# Patient Record
Sex: FEMALE | Race: WHITE | NOT HISPANIC OR LATINO | Employment: FULL TIME | URBAN - METROPOLITAN AREA
[De-identification: names, ages, dates, MRNs, and addresses within clinical notes are randomized per-mention and may not be internally consistent; named-entity substitution may affect disease eponyms.]

---

## 2018-11-21 ENCOUNTER — MEDICAL CORRESPONDENCE (OUTPATIENT)
Dept: HEALTH INFORMATION MANAGEMENT | Facility: CLINIC | Age: 43
End: 2018-11-21

## 2018-11-21 ENCOUNTER — TRANSFERRED RECORDS (OUTPATIENT)
Dept: HEALTH INFORMATION MANAGEMENT | Facility: CLINIC | Age: 43
End: 2018-11-21

## 2018-11-29 ASSESSMENT — ENCOUNTER SYMPTOMS
SPEECH CHANGE: 0
LOSS OF CONSCIOUSNESS: 0
DIZZINESS: 1
TINGLING: 1
NUMBNESS: 1
SEIZURES: 0
HEADACHES: 1
MEMORY LOSS: 0
TREMORS: 0
DISTURBANCES IN COORDINATION: 0
PARALYSIS: 0
WEAKNESS: 1

## 2018-12-02 ENCOUNTER — HEALTH MAINTENANCE LETTER (OUTPATIENT)
Age: 43
End: 2018-12-02

## 2018-12-07 ENCOUNTER — OFFICE VISIT (OUTPATIENT)
Dept: NEUROLOGY | Facility: CLINIC | Age: 43
End: 2018-12-07
Payer: COMMERCIAL

## 2018-12-07 VITALS
HEART RATE: 81 BPM | OXYGEN SATURATION: 100 % | SYSTOLIC BLOOD PRESSURE: 117 MMHG | DIASTOLIC BLOOD PRESSURE: 75 MMHG | WEIGHT: 131 LBS | BODY MASS INDEX: 21.83 KG/M2 | HEIGHT: 65 IN | TEMPERATURE: 97.8 F

## 2018-12-07 DIAGNOSIS — H90.5 SENSORY HEARING LOSS, UNILATERAL: ICD-10-CM

## 2018-12-07 DIAGNOSIS — M54.12 CERVICAL RADICULOPATHY: Primary | ICD-10-CM

## 2018-12-07 DIAGNOSIS — R42 VERTIGO: ICD-10-CM

## 2018-12-07 ASSESSMENT — PAIN SCALES - GENERAL: PAINLEVEL: MILD PAIN (3)

## 2018-12-07 NOTE — LETTER
"2018       RE: Amy Desir  [address] [place]  [facility] [place] [address]     Dear Colleague,    Thank you for referring your patient, Amy Desir, to the [facility] NEUROLOGY at [facility]. Please see a copy of my visit note below.    Service Date: 2018      Kirsten Rivera PA-C   [address] [address]      RE: Amy Desir   MRN: [id number]   : 1975      Dear Dr. Rivera:        Thank you for referring Amy Desir for neurologic consultation on 2018.  The patient comes with chief complaints of neck and head pain, vertigo, and left arm pain.      The patient has a somewhat complicated history.  She said that she has suffered from headache and neck pain most of her life.  She said these headaches are bifrontal temporal.  She basically wakes up and goes to bed with them.  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  She has been using a mouthguard since .  She is aware that she does grind and clench her teeth.  The patient has just lived with these headaches and has at times used ibuprofen.  She related this discomfort to braces being placed when she was in puberty.  The patient did have a new event in 2017.  She had been involved in what she described as strenuous workouts.  She said this would last up to an hour.  She would do these approximately once per week.  She related that she was doing more strenuous overhead lifting.  She was using dumbbells that weighed about 8 pounds each.  She noted that the entire left arm became numb.  She was nauseated.  Two days later she recollected that she was dizzy.  She was evaluated at [facility].  She has had an EKG was done to \"rule out a heart attack.\"  She was told that she had a \"pinched nerve\".  She went a few times to physical therapy.  The patient did note back then that those symptoms involving the left arm " "were worse with sitting and better with walking.  She started to do meditation and stretching exercises.  She avoided sitting too long.  She reduced the amount of weight she was using to 5-pound dumbbells.  The patient said that she still has some residual left arm numbness.  Then she went with her  to a  in [place], [place].  She said that this involved a long car ride.  She said in the middle of the night she felt as if she was \"tilting\".  She was quite nauseated.  She did note that her heart rate increased to \"140.\"  The patient said that she developed true vertigo.  She had nausea but did not vomit.  That continued.  She still has some sensation of vertigo at times, but it is more of an off kilter feeling.  She did describe having chronic neck pain even as a teenager.  She does not recall any injury.  She was a gymnast.  She has never had Lhermitte sign, difficulty passing her stool, but she does have some urinary leakage that she blames on childbirth.  She wears a tampon when she works out and she does not have leakage otherwise.  She has never had any decreased perirectal or genital sensory loss.  She has had no trouble with her right arm or both legs.  The patient says her balance is good.  She did though recall that she has had some tingling then later involving the left upper thigh.  She could not really give me that many details about that.  The patient did note that ever since high school she has treated with a chiropractor.  She estimates she has had at least 50 treatments.  The patient has only had an activator used as her  did suffer a carotid dissection.  Fortunately, he suffered no stroke symptoms with that.  She denied any visual loss.  She did note that at age 12 she developed at times what she described as a \"pixilated\" visual symptom.  This would last possibly up to 20 minutes.  The patient said that she has never had other issues with her vision and denied any actual " focal weakness.  She earlier told me her balance is good.  The patient said that yesterday she vomited.  She did have a few days of vertigo in November but when it was ongoing it only lasted until 11/05 and then she had an off kilter sensation.  She does not recall having any upper respiratory tract infections or head trauma.  The patient did note that she has had off and on since she was a teenager what she described as tinnitus that has not been pulsatile.  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  She noted that her grandmother on her mother's side had the same type of finding.      The patient did have a number of tests done that I reviewed with her.  She had a CT angiogram of the head and neck on 11/28/2017.  This was for a history of dizziness and giddiness.  This was unremarkable.  I could not get the actual vertebral studies and did review these then with Dr. Flores.  The initial study was done by Dr. Rogers.  The patient did have also other studies done.  She had on 11/22/2017 a carotid ultrasound.  Vertebral waveforms on the left demonstrated manifestations that could be associated with early steal phenomena from proximal subclavian artery stenosis.  This in part led to the CT angiogram.  In the patient's duplex ultrasound they also checked radial and ulnar arteries and these appeared to be basically unremarkable.      The patient did have other records I reviewed with her.  These included those from [place].  She never did have an MRI scan.  The patient did quit smoking at age 23.  She quit after about 6 years.  She probably does drink alcohol, either wine or beer, 5 days out of 7.  She does not drink beyond that.  She did review with me her vocation.  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  She has no listed drug allergies.  The patient has had a LEEP procedure and a D&C.  She reviewed her family  history and her father is doing well.  He is a recovering alcoholic and he had bypass surgery and is now 70.  Her mother has had a history of thyroid disease and is otherwise doing well.  She has 1 full sister and a half-brother.  He evidently has had some issues with alcohol, but overall he is doing well and her sister is too.  The patient did get a prescription for minocycline.  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      The patient did describe a family history of hearing loss and she has had some hearing loss now that has been gradual over the last 4-5 years and may be in 1 ear but she cannot really tell.      Neurologic examination revealed a pleasant woman.  She does have a port wine stain involving her forehead.  Her blood pressure was 117/75 with a pulse of 81.  Gait, station, cerebellar testing, muscle stretch reflexes that were brisk and symmetric in the arms and knees with ankle jerks being normal with positive Brown reflexes with no clonus in the legs and negative plantar stimulation, normal strength, cranial nerve examination except for a mild miosis of the right pupil compared to the left, but they are both reactive with full visual fields and full extraocular movements with symmetric face, tongue and uvula with fluent speech, superficial and cortical sensory testing are unremarkable.  The patient had normal range of motion of her neck, although it hurt for her to extend her head to the left.  She had a negative Barany test or spinning her and she had a negative Nylen-Barany test and it did not produce any of her symptoms.  I could not auscultate cervical bruits.  She had a regular cardiac rhythm without gallops or murmurs.      IMPRESSION:   1.  Left arm symptoms, but probably represent a cervical radicular syndrome.   2.  Vertigo, which could relate to either vestibular neuronitis or less likely to benign positional vertigo.      I went over the  patient her issues.  She also has had tinnitus.  She is going to go ahead now and have further studies including MRI scan of the brain and also her cervical spine.  The patient does need this MRI scan study because of her gradual hearing loss.  Depending on the test results, I will make other recommendations.        Sincerely yours,       Leandro Garcia MD      I spent 1 hour with the patient.  Over 50% of the time this involved counseling and coordination of care.  A complete review of medical systems was done and the positive review is listed in the report above.

## 2018-12-07 NOTE — MR AVS SNAPSHOT
After Visit Summary   12/7/2018    Amy Desir    MRN: [id number]           Patient Information     Date Of Birth          1975        Visit Information        Provider Department      12/7/2018 2:30 PM Leandro Garcia MD [facility] Neurology        Today's Diagnoses     Cervical radiculopathy    -  1    Vertigo        Sensory hearing loss, unilateral           Follow-ups after your visit        Follow-up notes from your care team     Return in about 10 days (around 12/17/2018).      Your next 10 appointments already scheduled     Dec 17, 2018  2:30 PM CST   MR CERVICAL SPINE W/O CONTRAST with URMR2   [facility], [place], MRI ([facility])    [facility] [address] [phone]           How do I prepare for my exam? (Food and drink instructions) **If you will be receiving sedation or general anesthesia, please see special notes below.**  How do I prepare for my exam? (Other instructions) Take your medicines as usual, unless your doctor tells you not to. Please remove any body piercings and hair extensions before you arrive. Follow your doctor s orders. If you do not, we may have to postpone your exam. You may or may not receive IV contrast for this exam pending the discretion of the Radiologist.  You do not need to do anything special to prepare. **If you will be receiving sedation or general anesthesia, please see special notes below.**  What should I wear:  The MRI machine uses a strong magnet. Please wear clothes without metal (snaps, zippers). A sweatsuit works well, or we may give you a hospital gown.  How long does the exam take: Most tests take 30 to 60 minutes.  HOWEVER, IF YOUR DOCTOR PRESCRIBES ANESTHESIA please plan on spending four to five hours in the recovery room.  What should I bring: Bring a list of your current medicines to your exam (including vitamins, minerals and over-the-counter drugs). Also  bring the results of similar scans you may have had.  Do I need a : **If you will be receiving sedation or general anesthesia, please see special notes below.**  What should I do after the exam? No Restrictions, You may resume normal activities.  What is this test: MRI (magnetic resonance imaging) uses a strong magnet and radio waves to look inside the body. An MRA (magnetic resonance angiogram) does the same thing, but it lets us look at your blood vessels. A computer turns the radio waves into pictures showing cross sections of the body, much like slices of bread. This helps us see any problems more clearly.  Who should I call with questions: Please call the Imaging Department at your exam site with any questions. Directions, parking instructions, and other information is available on our website, [email or web address].  How do I prepare if I m having sedation or anesthesia? **IMPORTANT** THE INSTRUCTIONS BELOW ARE ONLY FOR THOSE PATIENTS WHO HAVE BEEN TOLD THEY WILL RECEIVE SEDATION OR GENERAL ANESTHESIA DURING THEIR MRI PROCEDURE:  IF YOU WILL RECEIVE SEDATION (take medicine to help you relax during your exam): You must get the medicine from your doctor before you arrive. Bring the medicine to the exam. Do not take it at home. Arrive one hour early. Bring someone who can take you home after the test. Your medicine will make you sleepy. After the exam, you may not drive, take a bus or take a taxi by yourself. No eating 8 hours before your exam. You may have clear liquids up until 4 hours before your exam. (Clear liquids include water, clear tea, black coffee and fruit juice without pulp.)  IF YOU WILL RECEIVE ANESTHESIA (be asleep for your exam): Arrive 1 1/2 hours early. Bring someone who can take you home after the test. You may not drive, take a bus or take a taxi by yourself. No eating 8 hours before your exam. You may have clear liquids up until 4 hours before your exam. (Clear liquids include water,  clear tea, black coffee and fruit juice without pulp.) You will spend four to five hours in the recovery room.            Dec 17, 2018  3:15 PM CST   MR BRAIN W/O & W CONTRAST with URMR2   [facility], [place], MRI ([facility])    [facility] [address] [phone]           How do I prepare for my exam? (Food and drink instructions) **If you will be receiving sedation or general anesthesia, please see special notes below.**  How do I prepare for my exam? (Other instructions) Take your medicines as usual, unless your doctor tells you not to. You may or may not receive intravenous (IV) contrast for this exam pending the discretion of the Radiologist.  You do not need to do anything special to prepare.  **If you will be receiving sedation or general anesthesia, please see special notes below.**  What should I wear: The MRI machine uses a strong magnet. Please wear clothes without metal (snaps, zippers). A sweatsuit works well, or we may give you a hospital gown. Please remove any body piercings and hair extensions before you arrive. You will also remove watches, jewelry, hairpins, wallets, dentures, partial dental plates and hearing aids. You may wear contact lenses, and you may be able to wear your rings. We have a safe place to keep your personal items, but it is safer to leave them at home.  How long does the exam take: Most tests take 30 to 60 minutes.  HOWEVER, IF YOUR DOCTOR PRESCRIBES ANESTHESIA please plan on spending four to five hours in the recovery room.  What should I bring:  Bring a list of your current medicines to your exam (including vitamins, minerals and over-the-counter drugs).  Do I need a :  **If you will be receiving sedation or general anesthesia, please see special notes below.**  What should I do after the exam: No Restrictions, You may resume normal activities.  What is this test: MRI (magnetic resonance imaging) uses  a strong magnet and radio waves to look inside the body. An MRA (magnetic resonance angiogram) does the same thing, but it lets us look at your blood vessels. A computer turns the radio waves into pictures showing cross sections of the body, much like slices of bread. This helps us see any problems more clearly. You may receive fluid (called  contrast ) before or during your scan. The fluid helps us see the pictures better. We give the fluid through an IV (small needle in your arm).  Who should I call with questions:  Please call the Imaging Department at your exam site with any questions. Directions, parking instructions, and other information is available on our website, [email or web address].  How do I prepare if I m having sedation or anesthesia? **IMPORTANT** THE INSTRUCTIONS BELOW ARE ONLY FOR THOSE PATIENTS WHO HAVE BEEN TOLD THEY WILL RECEIVE SEDATION OR GENERAL ANESTHESIA DURING THEIR MRI PROCEDURE:  IF YOU WILL RECEIVE SEDATION (take medicine to help you relax during your exam): You must get the medicine from your doctor before you arrive. Bring the medicine to the exam. Do not take it at home. Arrive one hour early. Bring someone who can take you home after the test. Your medicine will make you sleepy. After the exam, you may not drive, take a bus or take a taxi by yourself. No eating 8 hours before your exam. You may have clear liquids up until 4 hours before your exam. (Clear liquids include water, clear tea, black coffee and fruit juice without pulp.)  IF YOU WILL RECEIVE ANESTHESIA (be asleep for your exam): Arrive 1 1/2 hours early. Bring someone who can take you home after the test. You may not drive, take a bus or take a taxi by yourself. No eating 8 hours before your exam. You may have clear liquids up until 4 hours before your exam. (Clear liquids include water, clear tea, black coffee and fruit juice without pulp.)            Dec 18, 2018  9:30 AM CST   (Arrive by 9:15 AM)   Return Visit with  "Leandro Garcia MD   [facility] Neurology ([facility])    [address] [address] [phone]              Future tests that were ordered for you today     Open Future Orders        Priority Expected Expires Ordered    MRI Brain w & w/o contrast Routine 12/7/2018 12/7/2019 12/7/2018    MRI Cervical spine w/o contrast Routine  12/7/2019 12/7/2018            Who to contact     Please call your clinic at [phone] to:    Ask questions about your health    Make or cancel appointments    Discuss your medicines    Learn about your test results    Speak to your doctor            Additional Information About Your Visit        [email or web address] Information     [employer] gives you secure access to your electronic health record. If you see a primary care provider, you can also send messages to your care team and make appointments. If you have questions, please call your primary care clinic.  If you do not have a primary care provider, please call [phone] and they will assist you.      [employer] is an electronic gateway that provides easy, online access to your medical records. With [employer], you can request a clinic appointment, read your test results, renew a prescription or communicate with your care team.     To access your existing account, please contact your [facility] or call [phone] for assistance.        Care EveryWhere ID     This is your Care EveryWhere ID. This could be used by other organizations to access your [place] medical records  [id number]        Your Vitals Were     Pulse Temperature Height Last Period Pulse Oximetry BMI (Body Mass Index)    81 97.8  F (36.6  C) (Oral) 1.638 m (5' 4.5\") 11/23/2018 100% 22.14 kg/m2       Blood Pressure from Last 3 Encounters:   12/07/18 117/75    Weight from Last 3 Encounters:   12/07/18 59.4 kg (131 lb)               Primary Care Provider    None Specified       No primary " provider on file.        Equal Access to Services     JOHANN DOMINGUEZ : Hadii betty Deutsch, naty pereira, reji rothman. So [facility] [phone].    ATENCIÓN: Si habla español, tiene a hinds disposición servicios gratuitos de asistencia lingüística. Llame al [phone].    We comply with applicable federal civil rights laws and [place] laws. We do not discriminate on the basis of race, color, national origin, age, disability, sex, sexual orientation, or gender identity.            Thank you!     Thank you for choosing [facility] NEUROLOGY  for your care. Our goal is always to provide you with excellent care. Hearing back from our patients is one way we can continue to improve our services. Please take a few minutes to complete the written survey that you may receive in the mail after your visit with us. Thank you!             Your Updated Medication List - Protect others around you: Learn how to safely use, store and throw away your medicines at www.disposemymeds.org.      Notice  As of 12/7/2018  3:53 PM    You have not been prescribed any medications.

## 2018-12-07 NOTE — NURSING NOTE
Chief Complaint   Patient presents with     New Patient     CERVICAL RADICUOPATHY WITH VERTIGO       Sol Kingsley MA

## 2018-12-10 NOTE — PROGRESS NOTES
"Service Date: 2018      Kirsten Rivera PA-C   [address] [address]      RE: Amy Desir   MRN: [id number]   : 1975      Dear Dr. Rivera:        Thank you for referring Amy Desir for neurologic consultation on 2018.  The patient comes with chief complaints of neck and head pain, vertigo, and left arm pain.      The patient has a somewhat complicated history.  She said that she has suffered from headache and neck pain most of her life.  She said these headaches are bifrontal temporal.  She basically wakes up and goes to bed with them.  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  She has been using a mouthguard since .  She is aware that she does grind and clench her teeth.  The patient has just lived with these headaches and has at times used ibuprofen.  She related this discomfort to braces being placed when she was in puberty.  The patient did have a new event in 2017.  She had been involved in what she described as strenuous workouts.  She said this would last up to an hour.  She would do these approximately once per week.  She related that she was doing more strenuous overhead lifting.  She was using dumbbells that weighed about 8 pounds each.  She noted that the entire left arm became numb.  She was nauseated.  Two days later she recollected that she was dizzy.  She was evaluated at [facility].  She has had an EKG was done to \"rule out a heart attack.\"  She was told that she had a \"pinched nerve\".  She went a few times to physical therapy.  The patient did note back then that those symptoms involving the left arm were worse with sitting and better with walking.  She started to do meditation and stretching exercises.  She avoided sitting too long.  She reduced the amount of weight she was using to 5-pound dumbbells.  The patient said that she still has some residual left arm numbness.  Then she went with " "her  to a  in [place], [place].  She said that this involved a long car ride.  She said in the middle of the night she felt as if she was \"tilting\".  She was quite nauseated.  She did note that her heart rate increased to \"140.\"  The patient said that she developed true vertigo.  She had nausea but did not vomit.  That continued.  She still has some sensation of vertigo at times, but it is more of an off kilter feeling.  She did describe having chronic neck pain even as a teenager.  She does not recall any injury.  She was a gymnast.  She has never had Lhermitte sign, difficulty passing her stool, but she does have some urinary leakage that she blames on childbirth.  She wears a tampon when she works out and she does not have leakage otherwise.  She has never had any decreased perirectal or genital sensory loss.  She has had no trouble with her right arm or both legs.  The patient says her balance is good.  She did though recall that she has had some tingling then later involving the left upper thigh.  She could not really give me that many details about that.  The patient did note that ever since high school she has treated with a chiropractor.  She estimates she has had at least 50 treatments.  The patient has only had an activator used as her  did suffer a carotid dissection.  Fortunately, he suffered no stroke symptoms with that.  She denied any visual loss.  She did note that at age 12 she developed at times what she described as a \"pixilated\" visual symptom.  This would last possibly up to 20 minutes.  The patient said that she has never had other issues with her vision and denied any actual focal weakness.  She earlier told me her balance is good.  The patient said that yesterday she vomited.  She did have a few days of vertigo in November but when it was ongoing it only lasted until  and then she had an off kilter sensation.  She does not recall having any upper respiratory " tract infections or head trauma.  The patient did note that she has had off and on since she was a teenager what she described as tinnitus that has not been pulsatile.  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  She noted that her grandmother on her mother's side had the same type of finding.      The patient did have a number of tests done that I reviewed with her.  She had a CT angiogram of the head and neck on 11/28/2017.  This was for a history of dizziness and giddiness.  This was unremarkable.  I could not get the actual vertebral studies and did review these then with Dr. Flores.  The initial study was done by Dr. Rogers.  The patient did have also other studies done.  She had on 11/22/2017 a carotid ultrasound.  Vertebral waveforms on the left demonstrated manifestations that could be associated with early steal phenomena from proximal subclavian artery stenosis.  This in part led to the CT angiogram.  In the patient's duplex ultrasound they also checked radial and ulnar arteries and these appeared to be basically unremarkable.      The patient did have other records I reviewed with her.  These included those from [facility].  She never did have an MRI scan.  The patient did quit smoking at age 23.  She quit after about 6 years.  She probably does drink alcohol, either wine or beer, 5 days out of 7.  She does not drink beyond that.  She did review with me her vocation.  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  She has no listed drug allergies.  The patient has had a LEEP procedure and a D&C.  She reviewed her family history and her father is doing well.  He is a recovering alcoholic and he had bypass surgery and is now 70.  Her mother has had a history of thyroid disease and is otherwise doing well.  She has 1 full sister and a half-brother.  He evidently has had some issues with alcohol, but overall he is doing  well and her sister is too.  The patient did get a prescription for minocycline.  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      The patient did describe a family history of hearing loss and she has had some hearing loss now that has been gradual over the last 4-5 years and may be in 1 ear but she cannot really tell.      Neurologic examination revealed a pleasant woman.  She does have a port wine stain involving her forehead.  Her blood pressure was 117/75 with a pulse of 81.  Gait, station, cerebellar testing, muscle stretch reflexes that were brisk and symmetric in the arms and knees with ankle jerks being normal with positive Brown reflexes with no clonus in the legs and negative plantar stimulation, normal strength, cranial nerve examination except for a mild miosis of the right pupil compared to the left, but they are both reactive with full visual fields and full extraocular movements with symmetric face, tongue and uvula with fluent speech, superficial and cortical sensory testing are unremarkable.  The patient had normal range of motion of her neck, although it hurt for her to extend her head to the left.  She had a negative Barany test or spinning her and she had a negative Nylen-Barany test and it did not produce any of her symptoms.  I could not auscultate cervical bruits.  She had a regular cardiac rhythm without gallops or murmurs.      IMPRESSION:   1.  Left arm symptoms, but probably represent a cervical radicular syndrome.   2.  Vertigo, which could relate to either vestibular neuronitis or less likely to benign positional vertigo.      I went over the patient her issues.  She also has had tinnitus.  She is going to go ahead now and have further studies including MRI scan of the brain and also her cervical spine.  The patient does need this MRI scan study because of her gradual hearing loss.  Depending on the test results, I will make other  recommendations.        Sincerely yours,       José Garcia MD      I spent 1 hour with the patient.  Over 50% of the time this involved counseling and coordination of care.  A complete review of medical systems was done and the positive review is listed in the report above.         JOSÉ GARCIA MD             D: 12/10/2018   T: 12/10/2018   MT: AKA      Name:     RUDDY HAMMOND   MRN:      [id number]        Account:      [id number]   :      1975           Service Date: 2018      Document: [id number]

## 2018-12-17 ENCOUNTER — HOSPITAL ENCOUNTER (OUTPATIENT)
Dept: MRI IMAGING | Facility: CLINIC | Age: 43
End: 2018-12-17
Attending: PSYCHIATRY & NEUROLOGY
Payer: COMMERCIAL

## 2018-12-17 ENCOUNTER — HOSPITAL ENCOUNTER (OUTPATIENT)
Dept: MRI IMAGING | Facility: CLINIC | Age: 43
Discharge: HOME | End: 2018-12-17
Attending: PSYCHIATRY & NEUROLOGY | Admitting: PSYCHIATRY & NEUROLOGY
Payer: COMMERCIAL

## 2018-12-17 DIAGNOSIS — H90.5 SENSORY HEARING LOSS, UNILATERAL: ICD-10-CM

## 2018-12-17 DIAGNOSIS — R42 VERTIGO: ICD-10-CM

## 2018-12-17 DIAGNOSIS — M54.12 CERVICAL RADICULOPATHY: ICD-10-CM

## 2018-12-17 PROCEDURE — A9585 GADOBUTROL INJECTION: HCPCS | Performed by: PSYCHIATRY & NEUROLOGY

## 2018-12-17 PROCEDURE — 70553 MRI BRAIN STEM W/O & W/DYE: CPT

## 2018-12-17 PROCEDURE — 25500064 ZZH RX 255 OP 636: Performed by: PSYCHIATRY & NEUROLOGY

## 2018-12-17 PROCEDURE — 72141 MRI NECK SPINE W/O DYE: CPT

## 2018-12-17 RX ORDER — GADOBUTROL 604.72 MG/ML
7.5 INJECTION INTRAVENOUS ONCE
Status: COMPLETED | OUTPATIENT
Start: 2018-12-17 | End: 2018-12-17

## 2018-12-17 RX ADMIN — GADOBUTROL 6 ML: 604.72 INJECTION INTRAVENOUS at 14:35

## 2018-12-18 ENCOUNTER — OFFICE VISIT (OUTPATIENT)
Dept: NEUROLOGY | Facility: CLINIC | Age: 43
End: 2018-12-18

## 2018-12-18 VITALS — DIASTOLIC BLOOD PRESSURE: 64 MMHG | OXYGEN SATURATION: 100 % | HEART RATE: 75 BPM | SYSTOLIC BLOOD PRESSURE: 115 MMHG

## 2018-12-18 DIAGNOSIS — G54.0 THORACIC OUTLET SYNDROME: Primary | ICD-10-CM

## 2018-12-18 ASSESSMENT — PAIN SCALES - GENERAL: PAINLEVEL: NO PAIN (0)

## 2018-12-18 NOTE — NURSING NOTE
Chief Complaint   Patient presents with     RECHECK     [facility] RETURN - CERVICAL RADICULOPATHY WITH VERTIGO       Damien Dee, EMT

## 2018-12-18 NOTE — LETTER
2018       RE: Amy Desir  [address]  [facility] [address]     Dear Colleague,    Thank you for referring your patient, Amy Desir, to the [facility] NEUROLOGY at [facility]. Please see a copy of my visit note below.    Service Date: 2018      Kirsten Rivera PA-C   [address] [address]      RE: Amy Desir   MRN: [id number]   : 1975      Dear Dr. Rivera:      This is in regard to followup on Amy Desir.  The patient returned today with chief complaints of head and neck pain, vertigo, left arm pain and paresthesias.      The patient reviewed my consultation on 2018 and said it was correct.  I did go over with her studies that I have ordered and went over the actual films.  Her MRI scan from 2018 did show that she had multilevel cervical spondylosis and did have listhesis at C3-C4 and C6-C7.  It was most pronounced at C6-C7 and she had mild neural foraminal stenosis and the radiologist also felt she had mild spinal canal stenosis.  I reassured her that I did not think that she had pathologically significant foraminal stenosis nor canal stenosis.  I did point out the abnormalities to her.  The patient also did have an MRI scan of the brain with and without contrast.  In particular, she did have special images involving the seventh and eighth cranial nerves.  All of these studies were totally unremarkable.  I went over again the actual films with her.  I reassured her that this study was normal.      The patient did go over with me again her use of physical therapy.  She is pleased with a therapist you referred her to add [facility].      PHYSICAL EXAMINATION:  I did examine the patient again today.     VITAL SIGNS:  Her blood pressure is 115/64 with a pulse of 75.     She did have good radial pulses.  She had a positive Adson sign on the left  with her left arm abducted.  She had marked decrease in the left radial pulse.  She noted with holding her breath and turning her head to the right that her left hand symptoms started.  She also had a positive Tinel sign over the left supraclavicular fossa.      I have talked with the patient about the possibility that some of her symptoms in her left arm could relate to thoracic outlet syndrome.  I went over this condition with her.  I did describe exercises that are basically postural exercises to try.  I am going to refer her back to her [facility] physical therapist for this.  I went over other studies that could be done including EMG testing of the left arm as well as ultrasound of her arteries in her arms in different positions to be checked for significant thoracic outlet syndrome.  I went over from [facility] with her the diagnosis of thoracic outlet syndrome and studies that could be done including the MRI scan that has already been performed.  The patient did promise to have neurologic followup with me if she does not improve with my suggestions.  She understands if she has recurrent vertigo she is to come back here also.  I explained to her that it was possible she had vestibular neuronitis and has some mild lingering symptoms.  Again, I told her that I could not rule out that she did not have benign positional vertigo, although I doubted it with her history of sudden severe vertigo with improvement over time.      I spent 25 minutes with the patient today.  Over 50% of the time this involved counseling and coordination of care.      JOSÉ GAVIRIA MD             D: 2018   T: 2018   MT: AKA      Name:     RUDDY HAMMOND   MRN:      [id number]        Account:      [id number]   :      1975           Service Date: 2018      Document: [id number]

## 2018-12-18 NOTE — PROGRESS NOTES
Service Date: 2018      Kirsten Rivera PA-C   [address] [address]      RE: Amy Desir   MRN: [id number]   : 1975      Dear Dr. Rivera:      This is in regard to followup on Amy Desir.  The patient returned today with chief complaints of head and neck pain, vertigo, left arm pain and paresthesias.      The patient reviewed my consultation on 2018 and said it was correct.  I did go over with her studies that I have ordered and went over the actual films.  Her MRI scan from 2018 did show that she had multilevel cervical spondylosis and did have listhesis at C3-C4 and C6-C7.  It was most pronounced at C6-C7 and she had mild neural foraminal stenosis and the radiologist also felt she had mild spinal canal stenosis.  I reassured her that I did not think that she had pathologically significant foraminal stenosis nor canal stenosis.  I did point out the abnormalities to her.  The patient also did have an MRI scan of the brain with and without contrast.  In particular, she did have special images involving the seventh and eighth cranial nerves.  All of these studies were totally unremarkable.  I went over again the actual films with her.  I reassured her that this study was normal.      The patient did go over with me again her use of physical therapy.  She is pleased with a therapist you referred her to add [facility].      PHYSICAL EXAMINATION:  I did examine the patient again today.     VITAL SIGNS:  Her blood pressure is 115/64 with a pulse of 75.     She did have good radial pulses.  She had a positive Adson sign on the left with her left arm abducted.  She had marked decrease in the left radial pulse.  She noted with holding her breath and turning her head to the right that her left hand symptoms started.  She also had a positive Tinel sign over the left supraclavicular fossa.      I have talked with the patient about  the possibility that some of her symptoms in her left arm could relate to thoracic outlet syndrome.  I went over this condition with her.  I did describe exercises that are basically postural exercises to try.  I am going to refer her back to her [facility] physical therapist for this.  I went over other studies that could be done including EMG testing of the left arm as well as ultrasound of her arteries in her arms in different positions to be checked for significant thoracic outlet syndrome.  I went over from [facility] with her the diagnosis of thoracic outlet syndrome and studies that could be done including the MRI scan that has already been performed.  The patient did promise to have neurologic followup with me if she does not improve with my suggestions.  She understands if she has recurrent vertigo she is to come back here also.  I explained to her that it was possible she had vestibular neuronitis and has some mild lingering symptoms.  Again, I told her that I could not rule out that she did not have benign positional vertigo, although I doubted it with her history of sudden severe vertigo with improvement over time.      Thank you for allowing me to see this patient.       Sincerely yours,       José Garcia MD      I spent 25 minutes with the patient today.  Over 50% of the time this involved counseling and coordination of care.         JOSÉ GARCIA MD             D: 2018   T: 2018   MT: AKA      Name:     RUDDY HAMMOND   MRN:      [id number]        Account:      [id number]   :      1975           Service Date: 2018      Document: [id number]

## 2019-08-14 ENCOUNTER — TELEPHONE (OUTPATIENT)
Dept: NEUROLOGY | Facility: CLINIC | Age: 44
End: 2019-08-14

## 2019-08-14 NOTE — TELEPHONE ENCOUNTER
Health Call Center    Phone Message    May a detailed message be left on voicemail: yes    Reason for Call: Order(s):Update physical therapy order  Reason for requested: Fax sent with Request on 7.30.19  Date needed: ASAP  Provider name: Dr. Garcia Fax [phone]      Action Taken: Message routed to:  [facility] ([facility] neurology

## 2019-08-15 DIAGNOSIS — G54.0 THORACIC OUTLET SYNDROME: Primary | ICD-10-CM

## 2019-10-04 ENCOUNTER — HEALTH MAINTENANCE LETTER (OUTPATIENT)
Age: 44
End: 2019-10-04

## 2020-10-12 LAB — PAP SMEAR - HIM PATIENT REPORTED: NORMAL

## 2020-11-08 ENCOUNTER — HEALTH MAINTENANCE LETTER (OUTPATIENT)
Age: 45
End: 2020-11-08

## 2021-01-31 ENCOUNTER — HEALTH MAINTENANCE LETTER (OUTPATIENT)
Age: 46
End: 2021-01-31

## 2021-09-11 ENCOUNTER — HEALTH MAINTENANCE LETTER (OUTPATIENT)
Age: 46
End: 2021-09-11

## 2021-11-12 ENCOUNTER — ANCILLARY PROCEDURE (OUTPATIENT)
Dept: MAMMOGRAPHY | Facility: CLINIC | Age: 46
End: 2021-11-12
Attending: OBSTETRICS & GYNECOLOGY
Payer: COMMERCIAL

## 2021-11-12 DIAGNOSIS — Z12.31 VISIT FOR SCREENING MAMMOGRAM: ICD-10-CM

## 2021-11-12 PROCEDURE — 77067 SCR MAMMO BI INCL CAD: CPT | Mod: GC | Performed by: RADIOLOGY

## 2021-11-12 PROCEDURE — 77063 BREAST TOMOSYNTHESIS BI: CPT | Mod: GC | Performed by: RADIOLOGY

## 2021-11-22 ENCOUNTER — ANCILLARY PROCEDURE (OUTPATIENT)
Dept: MAMMOGRAPHY | Facility: CLINIC | Age: 46
End: 2021-11-22
Attending: OBSTETRICS & GYNECOLOGY
Payer: COMMERCIAL

## 2021-11-22 DIAGNOSIS — R92.8 ABNORMAL MAMMOGRAM OF BOTH BREASTS: ICD-10-CM

## 2021-11-22 PROCEDURE — G0279 TOMOSYNTHESIS, MAMMO: HCPCS | Performed by: RADIOLOGY

## 2021-11-22 PROCEDURE — 77066 DX MAMMO INCL CAD BI: CPT | Performed by: RADIOLOGY

## 2021-11-22 PROCEDURE — 76642 ULTRASOUND BREAST LIMITED: CPT | Mod: RT | Performed by: RADIOLOGY

## 2021-11-30 ENCOUNTER — ANCILLARY PROCEDURE (OUTPATIENT)
Dept: MAMMOGRAPHY | Facility: CLINIC | Age: 46
End: 2021-11-30
Attending: OBSTETRICS & GYNECOLOGY
Payer: COMMERCIAL

## 2021-11-30 DIAGNOSIS — R92.8 ABNORMAL FINDING ON BREAST IMAGING: ICD-10-CM

## 2021-11-30 DIAGNOSIS — R92.1 BREAST CALCIFICATIONS: ICD-10-CM

## 2021-11-30 PROCEDURE — 88305 TISSUE EXAM BY PATHOLOGIST: CPT | Mod: 26 | Performed by: PATHOLOGY

## 2021-11-30 PROCEDURE — 88305 TISSUE EXAM BY PATHOLOGIST: CPT | Mod: TC | Performed by: RADIOLOGY

## 2021-11-30 PROCEDURE — 77065 DX MAMMO INCL CAD UNI: CPT | Mod: LT | Performed by: STUDENT IN AN ORGANIZED HEALTH CARE EDUCATION/TRAINING PROGRAM

## 2021-11-30 PROCEDURE — 19081 BX BREAST 1ST LESION STRTCTC: CPT | Mod: LT | Performed by: STUDENT IN AN ORGANIZED HEALTH CARE EDUCATION/TRAINING PROGRAM

## 2021-11-30 PROCEDURE — 77066 DX MAMMO INCL CAD BI: CPT | Performed by: STUDENT IN AN ORGANIZED HEALTH CARE EDUCATION/TRAINING PROGRAM

## 2021-11-30 RX ORDER — LIDOCAINE HYDROCHLORIDE AND EPINEPHRINE 10; 10 MG/ML; UG/ML
10 INJECTION, SOLUTION INFILTRATION; PERINEURAL ONCE
Status: COMPLETED | OUTPATIENT
Start: 2021-11-30 | End: 2021-11-30

## 2021-11-30 RX ORDER — MEDROXYPROGESTERONE ACETATE 10 MG
TABLET ORAL
COMMUNITY
Start: 2021-10-25 | End: 2024-04-29

## 2021-11-30 RX ORDER — IOPAMIDOL 612 MG/ML
100 INJECTION, SOLUTION INTRAVASCULAR ONCE
Status: COMPLETED | OUTPATIENT
Start: 2021-11-30 | End: 2021-11-30

## 2021-11-30 RX ADMIN — IOPAMIDOL 87 ML: 612 INJECTION, SOLUTION INTRAVASCULAR at 13:06

## 2021-11-30 RX ADMIN — LIDOCAINE HYDROCHLORIDE AND EPINEPHRINE 10 ML: 10; 10 INJECTION, SOLUTION INFILTRATION; PERINEURAL at 13:27

## 2021-12-03 ENCOUNTER — TELEPHONE (OUTPATIENT)
Dept: MAMMOGRAPHY | Facility: CLINIC | Age: 46
End: 2021-12-03
Payer: COMMERCIAL

## 2021-12-03 LAB
PATH REPORT.COMMENTS IMP SPEC: NORMAL
PATH REPORT.COMMENTS IMP SPEC: NORMAL
PATH REPORT.FINAL DX SPEC: NORMAL
PATH REPORT.GROSS SPEC: NORMAL
PATH REPORT.MICROSCOPIC SPEC OTHER STN: NORMAL
PATH REPORT.RELEVANT HX SPEC: NORMAL
PHOTO IMAGE: NORMAL

## 2021-12-03 NOTE — TELEPHONE ENCOUNTER
Spoke to patient regarding left breast biopsy done on 11/30/21 with finding of benign fibrocystic changes with calcifications . Notified patient that the Radiologist recommendation is routine yearly mammography. Patient verbalized understanding and all questions and concerns answered to patients satisfaction.  latha.

## 2022-01-01 ENCOUNTER — HEALTH MAINTENANCE LETTER (OUTPATIENT)
Age: 47
End: 2022-01-01

## 2022-03-17 ENCOUNTER — TRANSFERRED RECORDS (OUTPATIENT)
Dept: HEALTH INFORMATION MANAGEMENT | Facility: CLINIC | Age: 47
End: 2022-03-17
Payer: COMMERCIAL

## 2022-03-17 ENCOUNTER — MEDICAL CORRESPONDENCE (OUTPATIENT)
Dept: HEALTH INFORMATION MANAGEMENT | Facility: CLINIC | Age: 47
End: 2022-03-17
Payer: COMMERCIAL

## 2022-03-17 LAB
CHOLESTEROL (EXTERNAL): 227 MG/DL
HDLC SERPL-MCNC: 90 MG/DL (ref 35–999)
LDL CHOLESTEROL CALCULATED (EXTERNAL): 109 MG/DL (ref 0–130)
TRIGLYCERIDES (EXTERNAL): 138 MG/DL (ref 20–150)

## 2022-03-21 ENCOUNTER — TRANSCRIBE ORDERS (OUTPATIENT)
Dept: OTHER | Age: 47
End: 2022-03-21

## 2022-03-21 DIAGNOSIS — H91.93 HEARING LOSS OF BOTH EARS: Primary | ICD-10-CM

## 2022-03-22 ENCOUNTER — PATIENT OUTREACH (OUTPATIENT)
Dept: ONCOLOGY | Facility: CLINIC | Age: 47
End: 2022-03-22
Payer: COMMERCIAL

## 2022-03-22 ENCOUNTER — TRANSCRIBE ORDERS (OUTPATIENT)
Dept: OTHER | Age: 47
End: 2022-03-22

## 2022-03-22 DIAGNOSIS — L81.9 CHANGE IN MULTIPLE PIGMENTED SKIN LESIONS: Primary | ICD-10-CM

## 2022-03-22 DIAGNOSIS — N60.92 ATYPICAL HYPERPLASIA OF LEFT BREAST: Primary | ICD-10-CM

## 2022-03-22 NOTE — PROGRESS NOTES
New Patient Oncology Nurse Navigator Note     Referring provider: Mary Nelson MD     Referring Clinic/Organization: [facility]     Date Referral Received: March 22, 2022     Evaluation for:  Benign breast condition     Clinical History (per Nurse review of records provided):    Amy had a bilateral screening mammogram on 11/12/21 and a possible asymmetry 10:30 posteriorly right, and possible calcifications 4:00 left were identified.  A diagnostic bilateral mammogram and right breast ultrasound followed on 11/22/21. Those showed the following:  Findings:     Mammogram: Additional mammographic views of the right breast were obtained to evaluate a focal asymmetry in the upper outer quadrant at the 10:30 position posterior depth, described on screening mammogram 11/12/2021. This does not persist on the spot compression views. Magnification views of the left breast demonstrate a segmental area of pleomorphic and coarse heterogeneous calcifications at the 4:00 position, middle to posterior depth, measuring approximately 3 cm in greatest dimension.     Ultrasound:  Targeted ultrasound evaluation was performed by the technologist and radiologist. Sonographic evaluation of the upper outer quadrant of the right breast demonstrates only normal breast tissue. There are no suspicious sonographic findings.    A contrast enhanced mammogram on 11/30/21 showed mild bilateral background parenchymal enhancement.  Low energy views demonstrate microcalcifications lower outer quadrant posterior depth left breast that correlate with 11/22/2021 exam. No definite abnormal enhancement in either breast.     11/30/21 -   LEFT breast, 4:00, posterior, calcifications, stereotactic core biopsy:  -Cystic hypersecretory change with atypical hyperplasia  -Fibrocystic change (including microcysts with apocrine metaplasia) and usual ductal hyperplasia  -Luminal calcifications in cystic hypersecretory change (in ducts without atypia and focally in  a duct with atypical hyperplasia)  -Stromal calcifications and detached calcifications  -Negative for malignancy    Records Location: See Bookmarked material     Records Needed: Breast imaging for past 5 years

## 2022-03-26 ENCOUNTER — DOCUMENTATION ONLY (OUTPATIENT)
Dept: ONCOLOGY | Facility: CLINIC | Age: 47
End: 2022-03-26
Payer: COMMERCIAL

## 2022-03-26 NOTE — PROGRESS NOTES
Action March 26, 2022 1:37 PM ABT   Action Taken Records from [facility] received and sent to HIM for upload. Image request sent to [facility].

## 2022-04-06 NOTE — PROGRESS NOTES
"Patient was contacted by New Patient Scheduling to schedule appointment with breast surgeon.  She declined this stating her follow up was annual mammography only.      Omar note 4/4/22 documents 11/30/21 \"abnormal mammogram and core biopsy with atypical hyperplasia.\"   See biopsy results below:  Final Diagnosis   LEFT breast, 4:00, posterior, calcifications, stereotactic core biopsy:  -Cystic hypersecretory change with atypical hyperplasia  -Fibrocystic change (including microcysts with apocrine metaplasia) and usual ductal hyperplasia  -Luminal calcifications in cystic hypersecretory change (in ducts without atypia and focally in a duct with atypical hyperplasia)  -Stromal calcifications and detached calcifications  -Negative for malignancy    Electronically signed by Kristine Alicea MD on 12/3/2021 at  2:33 PM   Clinical Information  [facility]   The patient is a 46-year-old woman, with LEFT breast calcifications detected by screening mammogram, and further characterized by diagnostic mammogram, which shows a 3 cm segmental area of pleomorphic and coarse LEFT breast calcifications at 4:00, at mid-posterior depth. Contrast enhanced mammogram shows no abnormal enhancement.  Procedure: LEFT breast stereotactic core biopsy (T-shaped biopsy marker placed).      Recommendation shared with patient at time of pathology results call on 12/3/21, \"Radiologist recommendation is routine yearly mammography.\"     Writer telephoned Dr. Nelson' office at [phone] to inform her of patient's refusal and ask for guidance or for provider to have conversation with patient about this referral. Brii Spence RN          "

## 2022-04-07 DIAGNOSIS — N60.99 ATYPICAL HYPERPLASIA OF BREAST: Primary | ICD-10-CM

## 2022-04-07 NOTE — PROGRESS NOTES
Telephoned and spoke with mAy and we discussed the results of her November 2021 left breast biopsy.  She understands a breast surgery consultation is the appropriate next step for this type of atypical ductal hyperplasia.   Call transferred to New Patient Scheduling to finalize scheduling with Dr. Muhammad.

## 2022-05-10 NOTE — TELEPHONE ENCOUNTER
RECORDS STATUS - BREAST    [facility]   DATE REQUESTED: 5/10   NOTES DETAILS STATUS   OFFICE NOTE from referring provider [facility]    OPERATIVE REPORT Epic 11/30/21: Breast Bx   MEDICATION LIST [facility]    LABS     PATHOLOGY REPORTS  (Tissue diagnosis, Stage, ER/MS percentage positive and intensity of staining, HER2 IHC, FISH, and all biopsies from breast and any distant metastasis)                 [facility] 11/30/21: Surg Path   IMAGING (NEED IMAGES & REPORT)     CT SCANS PACS 11/28/17: Epic   MRI PACS 12/17/18: Epic   MAMMO PACS 11/30/21, 11/22/21: Epic   ULTRASOUND PACS 11/22/21: Epic   BRAIN MRI PACS 12/17/18: [facility]

## 2022-05-19 ENCOUNTER — PRE VISIT (OUTPATIENT)
Dept: ONCOLOGY | Facility: CLINIC | Age: 47
End: 2022-05-19
Payer: COMMERCIAL

## 2022-05-19 ENCOUNTER — PATIENT OUTREACH (OUTPATIENT)
Dept: ONCOLOGY | Facility: CLINIC | Age: 47
End: 2022-05-19
Payer: COMMERCIAL

## 2022-05-19 ENCOUNTER — ONCOLOGY VISIT (OUTPATIENT)
Dept: ONCOLOGY | Facility: CLINIC | Age: 47
End: 2022-05-19
Attending: SURGERY
Payer: COMMERCIAL

## 2022-05-19 VITALS
WEIGHT: 129.9 LBS | BODY MASS INDEX: 21.64 KG/M2 | DIASTOLIC BLOOD PRESSURE: 79 MMHG | OXYGEN SATURATION: 100 % | TEMPERATURE: 97.9 F | HEIGHT: 65 IN | RESPIRATION RATE: 18 BRPM | HEART RATE: 73 BPM | SYSTOLIC BLOOD PRESSURE: 137 MMHG

## 2022-05-19 DIAGNOSIS — N60.99 ATYPICAL HYPERPLASIA OF BREAST: ICD-10-CM

## 2022-05-19 PROCEDURE — G0463 HOSPITAL OUTPT CLINIC VISIT: HCPCS

## 2022-05-19 PROCEDURE — 99203 OFFICE O/P NEW LOW 30 MIN: CPT | Performed by: SURGERY

## 2022-05-19 ASSESSMENT — PAIN SCALES - GENERAL: PAINLEVEL: NO PAIN (0)

## 2022-05-19 NOTE — LETTER
"    5/19/2022         RE: Amy Desir  [address] [place]  [facility] [address]        Dear Colleague,    Thank you for referring your patient, Amy Desir, to the [facility] BREAST [facility]. Please see a copy of my visit note below.    NEW SURGICAL CONSULTATION  May 19, 2022    Amy Desir is a 47 year old woman who presents with a left  breast complaint.  She was self-referred.    HPI:    She notes no masses in either breast, axilla, or neck. She denies any nipple discharge or nipple inversion. She has some vague discomfort on the left lateral breast.     Imaging showed calcifications at 4:00 posterior depth in the LEFT breast.    A biopsy was performed and a clip was placed.  It showed atypical hyperplasia.      BREAST-SPECIFIC HISTORY:  Prior breast surgeries: No  Prior radiation history: No  Bra size: 36 B  Dominant hand: Right    FAMILY HISTORY:  Breast ca: No  Ovarian ca: No  Pancreatic ca: No  Melanoma: No  Gastric ca: No  Colon ca: No  Other cancer: No    No past medical history on file.  Premenopausal  No HTN, DM    No past surgical history on file.  LEEP  D&C  No GA issues    Current Outpatient Medications   Medication Sig Dispense Refill     medroxyPROGESTERone (PROVERA) 10 MG tablet         No Known Allergies     SOCIAL HISTORY:  Smokes: No    She works as an  at [employer]. No heavy lifting; works from home.    ROS:  Easy bruising/bleeding: No  History of DVT/PE: No    /79   Pulse 73   Temp 97.9  F (36.6  C) (Oral)   Resp 18   Ht 1.645 m (5' 4.76\")   Wt 58.9 kg (129 lb 14.4 oz)   SpO2 100%   BMI 21.77 kg/m     Physical Exam  Constitutional:       Appearance: She is well-developed.   Chest:   Breasts: Breasts are symmetrical.      Right: No inverted nipple, mass, nipple discharge, skin change, tenderness, axillary adenopathy or supraclavicular adenopathy.      Left: No inverted nipple, mass, nipple discharge, skin change, tenderness, axillary " adenopathy or supraclavicular adenopathy.        Comments: Patient was examined in both supine and upright positions.   Lymphadenopathy:      Cervical: No cervical adenopathy.      Right cervical: No superficial, deep or posterior cervical adenopathy.     Left cervical: No superficial, deep or posterior cervical adenopathy.      Upper Body:      Right upper body: No supraclavicular, axillary or pectoral adenopathy.      Left upper body: No supraclavicular, axillary or pectoral adenopathy.      Comments: No lymphedema in bilateral upper extremities.   Skin:     General: Skin is warm and dry.          INVESTIGATIONS:    Contrast-Enhanced Mammogram (11/30/2021) showed:  Findings: Mild bilateral background parenchymal enhancement.  Low energy views demonstrate microcalcifications lower outer quadrant posterior depth left breast that correlate with 11/22/2021 exam.  No definite abnormal enhancement in either breast.  IMPRESSION: BI-RADS CATEGORY: 4 - Suspicious.    Diagnostic Mammogram & Ultrasound (11/22/2021) showed:  Breast Density: Heterogeneously dense  Findings:     Mammogram: Additional mammographic views of the right breast were obtained to evaluate a focal asymmetry in the upper outer quadrant at the 10:30 position posterior depth, described on screening mammogram 11/12/2021. This does not persist on the spot compression views.   Magnification views of the left breast demonstrate a segmental area of pleomorphic and coarse heterogeneous calcifications at the 4:00 position, middle to posterior depth, measuring approximately 3 cm in greatest dimension.  Ultrasound:  Targeted ultrasound evaluation was performed by the technologist and radiologist. Sonographic evaluation of the upper outer quadrant of the right breast demonstrates only normal breast tissue. There are no suspicious sonographic findings.  Impression: BI-RADS CATEGORY: 4 - Suspicious Abnormality-Biopsy Should Be Considered    Screening Mammogram  (11/12/2021) showed:  Findings: The breasts are heterogeneously dense, which may obscure small masses..  Possible asymmetry 10:30 posteriorly right, and possible calcifications 4:00 left.  IMPRESSION: ACR BI-RADS Category 0: Need Additional Imaging Evaluation    Biopsy (11/30/2021) showed:  Final Diagnosis   LEFT breast, 4:00, posterior, calcifications, stereotactic core biopsy:  -Cystic hypersecretory change with atypical hyperplasia  -Fibrocystic change (including microcysts with apocrine metaplasia) and usual ductal hyperplasia  -Luminal calcifications in cystic hypersecretory change (in ducts without atypia and focally in a duct with atypical hyperplasia)  -Stromal calcifications and detached calcifications  -Negative for malignancy        ASSESSMENT:  Amy Desir is a 47 year old woman with atypical hyperplasia of the LEFT breast.    I personally reviewed the imaging above.    I reviewed the imaging and diagnosis with Amy Desir.  The natural history of atypical hyperplasia were discussed with the patient.  This is a proliferative benign lesion of the breast.  An excision following the core needle biopsy is typically recommended for diagnostic purposes because there are a small percentage of atypical ductal hyperplasia that are upgraded to non-invasive or invasive cancer following excision.  Because the lesion is not palpable, a RFID seed-localized approach would be taken.  She would prior to surgery for an image-guided RFID seed placement, followed by a surgical excision in the operating room.  It is a same day surgery. The risks of a RFID seed-localized lumpectomy were discussed with the patient, including the risks of bleeding, wound infection, wound dehiscence, and post-operative contour change to the breast.  We discussed that surgical pathology results will be reviewed at the postoperative visit to allow for careful discussion of next steps and for answering questions.    We also reviewed the  alternative of watchful waiting, which would involve interval breast imaging in 6 months. Her initial biopsy was performed in November. It has essentially been 6 months.  Repeat imaging now instead of surgery is very reasonable.  We discussed that if there were imaging changes, surgery would likely be indicated.  She prefers to hold off on surgery for now.    We also reviewed that atypical ductal hyperplasia slightly increases her baseline risk for breast cancer.  A referral to our high risk clinic is recommended for high risk screening +/- chemoprevention.    All questions were answered.  She did seem to understand the above.  Amy Desir elected to proceed with LEFT diagnostic mammogram now and referral to the high risk clinic with Sruthi Perales PA-C.        Total time spent with the patient was 30 minutes, of which 75% was counseling.     PLAN:  1. LEFT diagnostic mammogram  2. Referral to Sruthi Perales PA-C of the high risk clinic      35 minutes spent on the date of the encounter doing chart review, review of test results, interpretation of tests, patient visit and documentation.          Again, thank you for allowing me to participate in the care of your patient.      Sincerely,    Tiffanie Muhammad MD

## 2022-05-19 NOTE — PROGRESS NOTES
RN CARE COORDINATION  Surgical Oncology      Met with Amy after clinic consultation appt with Dr. Muhammad on 5/19/2022  We reviewed Dr. Muhammad's plan of care:     - Plan for surveillance  - Diagnostic Mammogram, next available  - Visit with Sruthi Perales PA-C       Introduced self and role of RN Care Coordinator at [facility]. Provided contact information, [facility] phone number (which has options to talk with a Nurse available 24/7 - triage and RNCC via this option during business hours). Reviewed use of [employer] to contact team with questions or concerns while follow-up is being scheduled.       Writer answered all questions to the best of her ability. Approximately five minutes was spent in consultation with the patient.         ROSARIO Clements, RN  RN Care Coordinator  [facility]  Surgical Oncology

## 2022-05-19 NOTE — NURSING NOTE
"Oncology Rooming Note    May 19, 2022 10:05 AM   Amy GARDNER Karlee is a 47 year old female who presents for:    Chief Complaint   Patient presents with     New Patient     ATYPICAL HYPERPLASIA OF BREAST      Initial Vitals: /79   Pulse 73   Temp 97.9  F (36.6  C) (Oral)   Resp 18   Ht 1.645 m (5' 4.76\")   Wt 58.9 kg (129 lb 14.4 oz)   SpO2 100%   BMI 21.77 kg/m   Estimated body mass index is 21.77 kg/m  as calculated from the following:    Height as of this encounter: 1.645 m (5' 4.76\").    Weight as of this encounter: 58.9 kg (129 lb 14.4 oz). Body surface area is 1.64 meters squared.  No Pain (0) Comment: Data Unavailable   No LMP recorded. Patient is premenarcheal.  Allergies reviewed: Yes  Medications reviewed: Yes    Medications: Medication refills not needed today.  Pharmacy name entered into [facility]: [id number] [place] [address]    Clinical concerns: New patient      Quita Friedman CMA            "

## 2022-05-19 NOTE — PROGRESS NOTES
"NEW SURGICAL CONSULTATION  May 19, 2022    Amy Desir is a 47 year old woman who presents with a left  breast complaint.  She was self-referred.    HPI:    She notes no masses in either breast, axilla, or neck. She denies any nipple discharge or nipple inversion. She has some vague discomfort on the left lateral breast.     Imaging showed calcifications at 4:00 posterior depth in the LEFT breast.    A biopsy was performed and a clip was placed.  It showed atypical hyperplasia.      BREAST-SPECIFIC HISTORY:  Prior breast surgeries: No  Prior radiation history: No  Bra size: 36 B  Dominant hand: Right    FAMILY HISTORY:  Breast ca: No  Ovarian ca: No  Pancreatic ca: No  Melanoma: No  Gastric ca: No  Colon ca: No  Other cancer: No    No past medical history on file.  Premenopausal  No HTN, DM    No past surgical history on file.  LEEP  D&C  No GA issues    Current Outpatient Medications   Medication Sig Dispense Refill     medroxyPROGESTERone (PROVERA) 10 MG tablet         No Known Allergies     SOCIAL HISTORY:  Smokes: No    She works as an  at [employer]. No heavy lifting; works from home.    ROS:  Easy bruising/bleeding: No  History of DVT/PE: No    /79   Pulse 73   Temp 97.9  F (36.6  C) (Oral)   Resp 18   Ht 1.645 m (5' 4.76\")   Wt 58.9 kg (129 lb 14.4 oz)   SpO2 100%   BMI 21.77 kg/m     Physical Exam  Constitutional:       Appearance: She is well-developed.   Chest:   Breasts: Breasts are symmetrical.      Right: No inverted nipple, mass, nipple discharge, skin change, tenderness, axillary adenopathy or supraclavicular adenopathy.      Left: No inverted nipple, mass, nipple discharge, skin change, tenderness, axillary adenopathy or supraclavicular adenopathy.        Comments: Patient was examined in both supine and upright positions.   Lymphadenopathy:      Cervical: No cervical adenopathy.      Right cervical: No superficial, deep or posterior cervical adenopathy.     Left cervical: No " superficial, deep or posterior cervical adenopathy.      Upper Body:      Right upper body: No supraclavicular, axillary or pectoral adenopathy.      Left upper body: No supraclavicular, axillary or pectoral adenopathy.      Comments: No lymphedema in bilateral upper extremities.   Skin:     General: Skin is warm and dry.          INVESTIGATIONS:    Contrast-Enhanced Mammogram (11/30/2021) showed:  Findings: Mild bilateral background parenchymal enhancement.  Low energy views demonstrate microcalcifications lower outer quadrant posterior depth left breast that correlate with 11/22/2021 exam.  No definite abnormal enhancement in either breast.  IMPRESSION: BI-RADS CATEGORY: 4 - Suspicious.    Diagnostic Mammogram & Ultrasound (11/22/2021) showed:  Breast Density: Heterogeneously dense  Findings:     Mammogram: Additional mammographic views of the right breast were obtained to evaluate a focal asymmetry in the upper outer quadrant at the 10:30 position posterior depth, described on screening mammogram 11/12/2021. This does not persist on the spot compression views.   Magnification views of the left breast demonstrate a segmental area of pleomorphic and coarse heterogeneous calcifications at the 4:00 position, middle to posterior depth, measuring approximately 3 cm in greatest dimension.  Ultrasound:  Targeted ultrasound evaluation was performed by the technologist and radiologist. Sonographic evaluation of the upper outer quadrant of the right breast demonstrates only normal breast tissue. There are no suspicious sonographic findings.  Impression: BI-RADS CATEGORY: 4 - Suspicious Abnormality-Biopsy Should Be Considered    Screening Mammogram (11/12/2021) showed:  Findings: The breasts are heterogeneously dense, which may obscure small masses..  Possible asymmetry 10:30 posteriorly right, and possible calcifications 4:00 left.  IMPRESSION: ACR BI-RADS Category 0: Need Additional Imaging Evaluation    Biopsy  (11/30/2021) showed:  Final Diagnosis   LEFT breast, 4:00, posterior, calcifications, stereotactic core biopsy:  -Cystic hypersecretory change with atypical hyperplasia  -Fibrocystic change (including microcysts with apocrine metaplasia) and usual ductal hyperplasia  -Luminal calcifications in cystic hypersecretory change (in ducts without atypia and focally in a duct with atypical hyperplasia)  -Stromal calcifications and detached calcifications  -Negative for malignancy        ASSESSMENT:  Amy Desir is a 47 year old woman with atypical hyperplasia of the LEFT breast.    I personally reviewed the imaging above.    I reviewed the imaging and diagnosis with Amy Desir.  The natural history of atypical hyperplasia were discussed with the patient.  This is a proliferative benign lesion of the breast.  An excision following the core needle biopsy is typically recommended for diagnostic purposes because there are a small percentage of atypical ductal hyperplasia that are upgraded to non-invasive or invasive cancer following excision.  Because the lesion is not palpable, a RFID seed-localized approach would be taken.  She would prior to surgery for an image-guided RFID seed placement, followed by a surgical excision in the operating room.  It is a same day surgery. The risks of a RFID seed-localized lumpectomy were discussed with the patient, including the risks of bleeding, wound infection, wound dehiscence, and post-operative contour change to the breast.  We discussed that surgical pathology results will be reviewed at the postoperative visit to allow for careful discussion of next steps and for answering questions.    We also reviewed the alternative of watchful waiting, which would involve interval breast imaging in 6 months. Her initial biopsy was performed in November. It has essentially been 6 months.  Repeat imaging now instead of surgery is very reasonable.  We discussed that if there were imaging  changes, surgery would likely be indicated.  She prefers to hold off on surgery for now.    We also reviewed that atypical ductal hyperplasia slightly increases her baseline risk for breast cancer.  A referral to our high risk clinic is recommended for high risk screening +/- chemoprevention.    All questions were answered.  She did seem to understand the above.  Amy Desir elected to proceed with LEFT diagnostic mammogram now and referral to the high risk clinic with Sruthi Perales PA-C.        Total time spent with the patient was 30 minutes, of which 75% was counseling.     PLAN:  1. LEFT diagnostic mammogram  2. Referral to Sruthi Perales PA-C of the high risk clinic    Tiffanie Muhammad MD MSc [facility] FACS  Assistant Professor of Surgery  Division of Surgical Oncology  [facility]     35 minutes spent on the date of the encounter doing chart review, review of test results, interpretation of tests, patient visit and documentation.

## 2022-06-08 ENCOUNTER — OFFICE VISIT (OUTPATIENT)
Dept: AUDIOLOGY | Facility: CLINIC | Age: 47
End: 2022-06-08
Attending: OBSTETRICS & GYNECOLOGY
Payer: COMMERCIAL

## 2022-06-08 DIAGNOSIS — H91.93 HEARING LOSS OF BOTH EARS: ICD-10-CM

## 2022-06-08 DIAGNOSIS — R42 DIZZINESS: Primary | ICD-10-CM

## 2022-06-08 PROCEDURE — 92550 TYMPANOMETRY & REFLEX THRESH: CPT | Performed by: AUDIOLOGIST

## 2022-06-08 PROCEDURE — 92557 COMPREHENSIVE HEARING TEST: CPT | Performed by: AUDIOLOGIST

## 2022-06-08 NOTE — PROGRESS NOTES
AUDIOLOGY REPORT    SUBJECTIVE:  Amy Desir is a 47 year old female who was seen in the Audiology Clinic at the [facility] for audiologic evaluation, referred by Mary Nelson M.D.  The patient reports difficulty hearing in noisy environments- started noticing this at least 10 years ago, but worsened in the past few years. Feels right ear may be her better hearing ear. Very rare episodic tinnitus. Reports history of noise exposure listening to loud music when she was younger.  Reports issues with dizziness related to neck and rib abnormalities. Denies aural pain, pressure, drainage, history of ear surgeries.  Family history of unilateral sudden hearing loss (father, thought to be due to a medication)    OBJECTIVE:  Abuse Screening:  Do you feel unsafe at home or work/school? No  Do you feel threatened by someone? No  Does anyone try to keep you from having contact with others, or doing things outside of your home? No  Physical signs of abuse present? No     Fall Risk Screen:  1. Have you fallen two or more times in the past year? No  2. Have you fallen and had an injury in the past year? No    Timed Up and Go Score (in seconds): not tested  Is patient a fall risk? No  Referral initiated: No  Fall Risk Assessment Completed by Audiology    Otoscopic exam indicates ears are clear of cerumen bilaterally     Pure Tone Thresholds assessed using conventional audiometry with good  reliability from 250-8000 Hz bilaterally using insert earphones and circumaural headphones     RIGHT:  normal hearing sensitivity    LEFT:    normal hearing sensitivity    Tympanogram:    RIGHT: normal eardrum mobility    LEFT:   normal eardrum mobility    Reflexes (reported by stimulus ear):  RIGHT: Ipsilateral is present at normal levels  RIGHT: Contralateral is present at normal levels  LEFT:   Ipsilateral is present at normal levels  LEFT:   Contralateral is present at normal levels      Speech  Reception Threshold:    RIGHT: 10 dB HL    LEFT:   10 dB HL  Word Recognition Score:     RIGHT: 100% at 50 dB HL using NU-6 recorded word list.    LEFT:   100% at 50 dB HL using NU-6 recorded word list.      ASSESSMENT:   Normal hearing sensitivity bilaterally. Today s results were discussed with the patient in detail.     PLAN:  Patient was counseled regarding hearing loss and impact on communication. Handout on good communication strategies was given to patient. It is recommended that the patient return to monitor hearing status if changes are noted or new ear symptoms arise. Follow up with primary care regarding dizziness.  Please call this clinic with questions regarding these results or recommendations.        Santosh Lima.  Licensed Audiologist  [place] # [id number]

## 2022-06-28 NOTE — PROGRESS NOTES
NEW CONSULTATION   2022     Amy Desir is a 47 year old woman who presents with history of atypical hyperplasia.     HPI:    She was found on screening mammogram 21 to have left breast calcifications. Biopsy demonstrated atypical hyperplasia. She met with Dr. Muhammad and decided to move forward with imaging surveillance.     She denies any breast concerns including mass, skin change, nipple inversion or nipple discharge.   BREAST-SPECIFIC HISTORY:    Previous breast imaging: Yes   - 21 Smammo: Possible asymmetry 10:30 posteriorly right, and possible calcifications 4:00 left BI-RADS Category 0  - 21: bilateral diagnostic mammogram and right breast ultrasound: no concerning findings in the right breast, left breast 4:00 calcifications BI-RADS 4  - 21 left breast 4:00 stereotactic biopsy: cystic hypersecretory change with atypical hyperplasia     Prior breast biopsies/surgeries: Yes   - 21 left breast 4:00 stereotactic biopsy: cystic hypersecretory change with atypical hyperplasia     Prior history of breast cancer or DCIS: No  Prior radiation history: No   Self breast exams: Yes  Breast density: heterogeneously dense    GYN HISTORY:  . Age at 1st pregnancy: 31. Breastfeeding history: Yes. x2 years  Age at menarche: 15  Menopausal: premenopausal   Menopausal hormone replacement therapy: Yes   - progesterone    Contraception: male surgical     RISK ASSESSMENT: > 3% 5 year risk and > 20% lifetime risk   Paula:3% 5 year risk   CONSTANCE/Missyer-Kailazick: 37.7% lifetime risk  Tacos: n/a    FAMILY HISTORY:  Breast ca: No  Ovarian ca: No  Pancreatic ca: Yes  Prostate: Yes   - father 50's  Gastric ca: No  Melanoma: No  Colon ca: No  Other cancer: No  Other genetic, testing, syndromes, or clotting disorders: no     PAST MEDICAL HISTORY  No past medical history on file.     PAST SURGICAL HISTORY   No past surgical history on file.    MEDICATIONS  Current Outpatient Medications   Medication Sig  Dispense Refill     medroxyPROGESTERone (PROVERA) 10 MG tablet        ALLERGIES   No Known Allergies     SOCIAL HISTORY:  Smokes: No  EtOH: Yes 5 days per week  Exercise: running  Works for the [employer]     ROS:  Change in vision No  Headaches: no  Respiratory: No shortness of breath, dyspnea on exertion, cough, or hemoptysis   Cardiovascular: negative   Gastrointestinal: negative Abdominal pain: no  Breast: negative  Musculoskeletal: negative Joint pain No Back pain: no  Psychiatric: negative  Hematologic/Lymphatic/Immunologic: negative  Endocrine: negative    EXAM  /75 (BP Location: Left arm, Patient Position: Sitting, Cuff Size: Adult Small)   Pulse 60   Temp 98.1  F (36.7  C) (Oral)   Resp 18   Wt 58.9 kg (129 lb 14.4 oz)   SpO2 99%   BMI 21.77 kg/m     PHYSICAL EXAM  Respiratory: breathing non labored.   Breasts: Examination was done in both the upright and supine positions.  Breasts are symmetrical . No dominant fixed or suspicious masses noted. No skin or nipple changes. No nipple discharge.   No clavicular, cervical, or axillary lymphadenopathy.     INVESTIGATIONS:    6/29/22 right diagnostic mammogram: per Radiology stable., final report pending    ASSESSMENT/PLAN:    Amy Desir is a 47 year old woman with history of left breast atypical hyperplasia. 6 month follow up mammogram today was stable. She meets guidelines for high risk screening and risk reduction.     1) Atypical hyperplasia  We also reviewed that atypical hyperplasia increases her baseline risk for breast cancer. Patients diagnosed with atypical hyperplasia are at higher risk for developing breast cancer in the future. Discussed she meets NCCN guidelines for high risk screening based on lifetime risk for breast cancer of >20%. Screening recommendations based on NCCN guidelines. Clinical encounter every 6-12 months starting now. Yearly mammogram (to begin at diagnosis but not less than age 30) alternating with  yearly breast MRI (to begin at diagnosis but not less than age 25).   - Be familiar with your breast and promptly report any changes.   - Breast MRI due  - Bilateral diagnostic mammogram with clinic visit due: 11/23/22    2) Risk reduction  Discussed she is a candidate for breast cancer risk-reduction intervention. Counseling was provided with available strategies including lifestyle modifications and risk reduction therapy. Women with atypical hyperplasia have a 86% reduction in risk with 5 years of therapy. For healthy premenopausal women, data regarding the risk/benefit ratio for tamoxifen appear relatively favorable. The contraindications for Tamoxifen (history of deep vein thrombosis or pulmonary embolism, history or stroke, history of TIA, known inherited clotting trait, pregnancy, and potential pregnancy without an effective nonhormal method of method of contraception) along with potential side effects (hot flashes, deep vein thrombosis, pulmonary embolism (1 vs 0.3 per 1000 annually), stroke, cataracts, and endometrial cancer (2.3 vs 0.91 per 1000 annually)) including signs and symptoms were discussed. Discussed the results of recent research using low dose Tamoxifen.   - She plans to consider and is interested in low dose Tamoxifen  - Baseline gynecological examination recommended. Routine age appropriate gynecologic screening. She will discuss risk/benefits of her use of progesterone as well.     3) Lifestyle Modifications were provided.   - Maintain your best healthy weight. Higher body fat and adult weight gain is associated with increased risk for breast cancer. This increase in risk has been attributed to increase in circulating endogenous estrogen levels from fat tissue.   - Alcohol can raise estrogen. Alcohol consumption, even at moderate levels (1-2 drinks per day), increases breast cancer risk and are best avoided. If you choose to drink alcohol limit alcohol consumption to less than 1 drink per  day. (1 ounce of liquor, 6 ounces of wine, or 8 ounces of beer).  - Be active daily and void being sedentary.     Sruthi Perales PA-C    30 minutes spent on the date of the encounter doing chart review, review of test results, interpretation of tests, patient visit and documentation.

## 2022-06-29 ENCOUNTER — OFFICE VISIT (OUTPATIENT)
Dept: SURGERY | Facility: CLINIC | Age: 47
End: 2022-06-29
Attending: PHYSICIAN ASSISTANT
Payer: COMMERCIAL

## 2022-06-29 ENCOUNTER — ANCILLARY PROCEDURE (OUTPATIENT)
Dept: MAMMOGRAPHY | Facility: CLINIC | Age: 47
End: 2022-06-29
Attending: PHYSICIAN ASSISTANT
Payer: COMMERCIAL

## 2022-06-29 VITALS
TEMPERATURE: 98.1 F | WEIGHT: 129.9 LBS | DIASTOLIC BLOOD PRESSURE: 75 MMHG | OXYGEN SATURATION: 99 % | HEART RATE: 60 BPM | SYSTOLIC BLOOD PRESSURE: 116 MMHG | BODY MASS INDEX: 21.77 KG/M2 | RESPIRATION RATE: 18 BRPM

## 2022-06-29 DIAGNOSIS — N60.99 ATYPICAL HYPERPLASIA OF BREAST: ICD-10-CM

## 2022-06-29 DIAGNOSIS — Z91.89 AT HIGH RISK FOR BREAST CANCER: ICD-10-CM

## 2022-06-29 DIAGNOSIS — Z12.39 BREAST CANCER SCREENING, HIGH RISK PATIENT: ICD-10-CM

## 2022-06-29 DIAGNOSIS — N60.92 ATYPICAL HYPERPLASIA OF LEFT BREAST: Primary | ICD-10-CM

## 2022-06-29 PROCEDURE — G0279 TOMOSYNTHESIS, MAMMO: HCPCS | Mod: LT | Performed by: STUDENT IN AN ORGANIZED HEALTH CARE EDUCATION/TRAINING PROGRAM

## 2022-06-29 PROCEDURE — 99214 OFFICE O/P EST MOD 30 MIN: CPT | Performed by: PHYSICIAN ASSISTANT

## 2022-06-29 PROCEDURE — G0463 HOSPITAL OUTPT CLINIC VISIT: HCPCS

## 2022-06-29 PROCEDURE — 77065 DX MAMMO INCL CAD UNI: CPT | Mod: LT | Performed by: STUDENT IN AN ORGANIZED HEALTH CARE EDUCATION/TRAINING PROGRAM

## 2022-06-29 ASSESSMENT — PAIN SCALES - GENERAL: PAINLEVEL: NO PAIN (0)

## 2022-06-29 NOTE — PATIENT INSTRUCTIONS
Amy Desir is a 47 year old woman with history of left breast atypical hyperplasia. 6 month follow up mammogram today was stable. She meets guidelines for high risk screening and risk reduction.     1) Atypical hyperplasia  We also reviewed that atypical hyperplasia increases her baseline risk for breast cancer. Patients diagnosed with atypical hyperplasia are at higher risk for developing breast cancer in the future. Discussed she meets NCCN guidelines for high risk screening based on lifetime risk for breast cancer of >20%. Screening recommendations based on NCCN guidelines. Clinical encounter every 6-12 months starting now. Yearly mammogram (to begin at diagnosis but not less than age 30) alternating with yearly breast MRI (to begin at diagnosis but not less than age 25).   - Be familiar with your breast and promptly report any changes.   - Breast MRI due  - Bilateral diagnostic mammogram with clinic visit due: 11/23/22    2) Risk reduction  Discussed she is a candidate for breast cancer risk-reduction intervention. Counseling was provided with available strategies including lifestyle modifications and risk reduction therapy. Women with atypical hyperplasia have a 86% reduction in risk with 5 years of therapy. For healthy premenopausal women, data regarding the risk/benefit ratio for tamoxifen appear relatively favorable. The contraindications for Tamoxifen (history of deep vein thrombosis or pulmonary embolism, history or stroke, history of TIA, known inherited clotting trait, pregnancy, and potential pregnancy without an effective nonhormal method of method of contraception) along with potential side effects (hot flashes, deep vein thrombosis, pulmonary embolism (1 vs 0.3 per 1000 annually), stroke, cataracts, and endometrial cancer (2.3 vs 0.91 per 1000 annually)) including signs and symptoms were discussed. Discussed the results of recent research using low dose Tamoxifen.   - She plans to consider and is  interested in low dose Tamoxifen  - Contraception needed  - Baseline gynecological examination recommended. Routine age appropriate gynecologic screening.  Prompt evaluation of vaginal spotting.     3) Lifestyle Modifications were provided.   - Maintain your best healthy weight. Higher body fat and adult weight gain is associated with increased risk for breast cancer. This increase in risk has been attributed to increase in circulating endogenous estrogen levels from fat tissue.   - Alcohol can raise estrogen. Alcohol consumption, even at moderate levels (1-2 drinks per day), increases breast cancer risk and are best avoided. If you choose to drink alcohol limit alcohol consumption to less than 1 drink per day. (1 ounce of liquor, 6 ounces of wine, or 8 ounces of beer).  - Be active daily and void being sedentary.

## 2022-06-29 NOTE — NURSING NOTE
"Oncology Rooming Note    June 29, 2022 10:17 AM   Amyavani Desir is a 47 year old female who presents for:    Chief Complaint   Patient presents with     Oncology Clinic Visit     Breast cancer-mammogram      Initial Vitals: /75 (BP Location: Left arm, Patient Position: Sitting, Cuff Size: Adult Small)   Pulse 60   Temp 98.1  F (36.7  C) (Oral)   Resp 18   Wt 58.9 kg (129 lb 14.4 oz)   SpO2 99%   BMI 21.77 kg/m   Estimated body mass index is 21.77 kg/m  as calculated from the following:    Height as of 5/19/22: 1.645 m (5' 4.76\").    Weight as of this encounter: 58.9 kg (129 lb 14.4 oz). Body surface area is 1.64 meters squared.  No Pain (0) Comment: Data Unavailable   No LMP recorded. Patient is premenarcheal.  Allergies reviewed: Yes  Medications reviewed: Yes    Medications: Medication refills not needed today.  Pharmacy name entered into [facility]: [id number] [place] [address]    Clinical concerns: No major changes reported.        Alma Bonilla LPN June 29, 2022 10:17 AM              "

## 2022-06-29 NOTE — LETTER
2022         RE: Amy Desri  [address] [address]        Dear Colleague,    Thank you for referring your patient, Amy Desir, to the [facility]. Please see a copy of my visit note below.    NEW CONSULTATION   2022     Amy Desir is a 47 year old woman who presents with history of atypical hyperplasia.     HPI:    She was found on screening mammogram 21 to have left breast calcifications. Biopsy demonstrated atypical hyperplasia. She met with Dr. Muhammad and decided to move forward with imaging surveillance.     She denies any breast concerns including mass, skin change, nipple inversion or nipple discharge.   BREAST-SPECIFIC HISTORY:    Previous breast imaging: Yes   - 21 Smammo: Possible asymmetry 10:30 posteriorly right, and possible calcifications 4:00 left BI-RADS Category 0  - 21: bilateral diagnostic mammogram and right breast ultrasound: no concerning findings in the right breast, left breast 4:00 calcifications BI-RADS 4  - 21 left breast 4:00 stereotactic biopsy: cystic hypersecretory change with atypical hyperplasia     Prior breast biopsies/surgeries: Yes   - 21 left breast 4:00 stereotactic biopsy: cystic hypersecretory change with atypical hyperplasia     Prior history of breast cancer or DCIS: No  Prior radiation history: No   Self breast exams: Yes  Breast density: heterogeneously dense    GYN HISTORY:  . Age at 1st pregnancy: 31. Breastfeeding history: Yes. x2 years  Age at menarche: 15  Menopausal: premenopausal   Menopausal hormone replacement therapy: Yes   - progesterone    Contraception: male surgical     RISK ASSESSMENT: > 3% 5 year risk and > 20% lifetime risk   Paula:3% 5 year risk   CONSTANCE/Tyrer-Cuzick: 37.7% lifetime risk  Tacos: n/a    FAMILY HISTORY:  Breast ca: No  Ovarian ca: No  Pancreatic ca: Yes  Prostate: Yes   - father 50's  Gastric ca: No  Melanoma: No  Colon ca:  No  Other cancer: No  Other genetic, testing, syndromes, or clotting disorders: no     PAST MEDICAL HISTORY  No past medical history on file.     PAST SURGICAL HISTORY   No past surgical history on file.    MEDICATIONS  Current Outpatient Medications   Medication Sig Dispense Refill     medroxyPROGESTERone (PROVERA) 10 MG tablet        ALLERGIES   No Known Allergies     SOCIAL HISTORY:  Smokes: No  EtOH: Yes 5 days per week  Exercise: running  Works for the [employer]     ROS:  Change in vision No  Headaches: no  Respiratory: No shortness of breath, dyspnea on exertion, cough, or hemoptysis   Cardiovascular: negative   Gastrointestinal: negative Abdominal pain: no  Breast: negative  Musculoskeletal: negative Joint pain No Back pain: no  Psychiatric: negative  Hematologic/Lymphatic/Immunologic: negative  Endocrine: negative    EXAM  /75 (BP Location: Left arm, Patient Position: Sitting, Cuff Size: Adult Small)   Pulse 60   Temp 98.1  F (36.7  C) (Oral)   Resp 18   Wt 58.9 kg (129 lb 14.4 oz)   SpO2 99%   BMI 21.77 kg/m     PHYSICAL EXAM  Respiratory: breathing non labored.   Breasts: Examination was done in both the upright and supine positions.  Breasts are symmetrical . No dominant fixed or suspicious masses noted. No skin or nipple changes. No nipple discharge.   No clavicular, cervical, or axillary lymphadenopathy.     INVESTIGATIONS:    6/29/22 right diagnostic mammogram: per Radiology stable., final report pending    ASSESSMENT/PLAN:    Amy Desir is a 47 year old woman with history of left breast atypical hyperplasia. 6 month follow up mammogram today was stable. She meets guidelines for high risk screening and risk reduction.     1) Atypical hyperplasia  We also reviewed that atypical hyperplasia increases her baseline risk for breast cancer. Patients diagnosed with atypical hyperplasia are at higher risk for developing breast cancer in the future. Discussed she meets NCCN  guidelines for high risk screening based on lifetime risk for breast cancer of >20%. Screening recommendations based on NCCN guidelines. Clinical encounter every 6-12 months starting now. Yearly mammogram (to begin at diagnosis but not less than age 30) alternating with yearly breast MRI (to begin at diagnosis but not less than age 25).   - Be familiar with your breast and promptly report any changes.   - Breast MRI due  - Bilateral diagnostic mammogram with clinic visit due: 11/23/22    2) Risk reduction  Discussed she is a candidate for breast cancer risk-reduction intervention. Counseling was provided with available strategies including lifestyle modifications and risk reduction therapy. Women with atypical hyperplasia have a 86% reduction in risk with 5 years of therapy. For healthy premenopausal women, data regarding the risk/benefit ratio for tamoxifen appear relatively favorable. The contraindications for Tamoxifen (history of deep vein thrombosis or pulmonary embolism, history or stroke, history of TIA, known inherited clotting trait, pregnancy, and potential pregnancy without an effective nonhormal method of method of contraception) along with potential side effects (hot flashes, deep vein thrombosis, pulmonary embolism (1 vs 0.3 per 1000 annually), stroke, cataracts, and endometrial cancer (2.3 vs 0.91 per 1000 annually)) including signs and symptoms were discussed. Discussed the results of recent research using low dose Tamoxifen.   - She plans to consider and is interested in low dose Tamoxifen  - Baseline gynecological examination recommended. Routine age appropriate gynecologic screening. She will discuss risk/benefits of her use of progesterone as well.     3) Lifestyle Modifications were provided.   - Maintain your best healthy weight. Higher body fat and adult weight gain is associated with increased risk for breast cancer. This increase in risk has been attributed to increase in circulating  endogenous estrogen levels from fat tissue.   - Alcohol can raise estrogen. Alcohol consumption, even at moderate levels (1-2 drinks per day), increases breast cancer risk and are best avoided. If you choose to drink alcohol limit alcohol consumption to less than 1 drink per day. (1 ounce of liquor, 6 ounces of wine, or 8 ounces of beer).  - Be active daily and void being sedentary.       30 minutes spent on the date of the encounter doing chart review, review of test results, interpretation of tests, patient visit and documentation.         Again, thank you for allowing me to participate in the care of your patient.      Sincerely,    Sruthi Perales PA-C

## 2022-10-11 ENCOUNTER — ANCILLARY PROCEDURE (OUTPATIENT)
Dept: MRI IMAGING | Facility: CLINIC | Age: 47
End: 2022-10-11
Attending: PHYSICIAN ASSISTANT
Payer: COMMERCIAL

## 2022-10-11 DIAGNOSIS — Z91.89 AT HIGH RISK FOR BREAST CANCER: ICD-10-CM

## 2022-10-11 DIAGNOSIS — Z12.39 BREAST CANCER SCREENING, HIGH RISK PATIENT: ICD-10-CM

## 2022-10-11 PROCEDURE — 77049 MRI BREAST C-+ W/CAD BI: CPT | Performed by: RADIOLOGY

## 2022-10-11 PROCEDURE — A9585 GADOBUTROL INJECTION: HCPCS | Performed by: RADIOLOGY

## 2022-10-11 RX ORDER — GADOBUTROL 604.72 MG/ML
7.5 INJECTION INTRAVENOUS ONCE
Status: COMPLETED | OUTPATIENT
Start: 2022-10-11 | End: 2022-10-11

## 2022-10-11 RX ADMIN — GADOBUTROL 6 ML: 604.72 INJECTION INTRAVENOUS at 17:44

## 2022-10-11 NOTE — DISCHARGE INSTRUCTIONS
MRI Contrast Discharge Instructions    The IV contrast you received today will pass out of your body in your  urine. This will happen in the next 24 hours. You will not feel this process.  Your urine will not change color.    Drink at least 4 extra glasses of water or juice today (unless your doctor  has restricted your fluids). This reduces the stress on your kidneys.  You may take your regular medicines.    If you are on dialysis: It is best to have dialysis today.    If you have a reaction: Most reactions happen right away. If you have  any new symptoms after leaving the hospital (such as hives or swelling),  call your hospital at the correct number below. Or call your family doctor.  If you have breathing distress or wheezing, call 911.    Special instructions: ***    I have read and understand the above information.    Signature:______________________________________ Date:___________    Staff:__________________________________________ Date:___________     Time:__________    [facility] Departments:    ___[place]: [phone]  ___[facility]: [phone]  ___[place]: [phone] ___[facility]: [phone]  ___[facility]: [phone]  ___[facility]: [phone]  ___[phone]  ___[facility]: [phone]  ___[phone]

## 2022-10-30 ENCOUNTER — HEALTH MAINTENANCE LETTER (OUTPATIENT)
Age: 47
End: 2022-10-30

## 2022-12-06 NOTE — PROGRESS NOTES
FOLLOW UP  Dec 8, 2022     Amy Desir is a 47 year old woman who presents with history of atypical hyperplasia.     HPI:    She was found on screening mammogram 21 to have left breast calcifications. Biopsy demonstrated atypical hyperplasia. She met with Dr. Muhammad and decided to move forward with imaging surveillance. 6 month follow up left diagnostic mammogram was stable.     She denies any breast concerns including mass, skin change, nipple inversion or nipple discharge.      BREAST-SPECIFIC HISTORY:    Previous breast imaging: Yes   - 21 Smammo: Possible asymmetry 10:30 posteriorly right, and possible calcifications 4:00 left BI-RADS Category 0  - 21: bilateral diagnostic mammogram and right breast ultrasound: no concerning findings in the right breast, left breast 4:00 calcifications BI-RADS 4  - 21 left breast 4:00 stereotactic biopsy: cystic hypersecretory change with atypical hyperplasia   - 22 left diagnostic mammogram: stable course calcifications 4:00 BI-RADS 3  - 10/11/22 breast MRI BI-RADS 1    Prior breast biopsies/surgeries: Yes   - 21 left breast 4:00 stereotactic biopsy: cystic hypersecretory change with atypical hyperplasia     Prior history of breast cancer or DCIS: No  Prior radiation history: No   Self breast exams: Yes  Breast density: heterogeneously dense    GYN HISTORY:  . Age at 1st pregnancy: 31. Breastfeeding history: Yes. x2 years  Age at menarche: 15  Menopausal: premenopausal   Menopausal hormone replacement therapy: Yes   - progesterone    Contraception: male surgical     RISK ASSESSMENT: > 3% 5 year risk and > 20% lifetime risk   Paula: 3% 5 year risk   CONSTANCE/Missyer-Kailazick: 31.9% lifetime risk  Tacos: n/a    FAMILY HISTORY:  Breast ca: No  Ovarian ca: No  Pancreatic ca: Yes  Prostate: Yes   - father 50's  Gastric ca: No  Melanoma: No  Colon ca: No  Other cancer: No  Other genetic, testing, syndromes, or clotting disorders: no     PAST MEDICAL  HISTORY  No past medical history on file.     PAST SURGICAL HISTORY   No past surgical history on file.    MEDICATIONS  Current Outpatient Medications   Medication Sig Dispense Refill     medroxyPROGESTERone (PROVERA) 10 MG tablet  (Patient not taking: Reported on 12/8/2022)       ALLERGIES   No Known Allergies     SOCIAL HISTORY:  Smokes: No  EtOH: Yes 5 days per week  Exercise: running  Works for the [employer]  [place]. She has a 16 year old daughter and 13 year old. Her parents are moving to [place].     ROS:  Change in vision No  Headaches: no  Respiratory: No shortness of breath, dyspnea on exertion, cough, or hemoptysis   Cardiovascular: negative   Gastrointestinal: negative Abdominal pain: no  Breast: negative  Musculoskeletal: negative Joint pain No Back pain: no  Psychiatric: negative  Hematologic/Lymphatic/Immunologic: negative  Endocrine: negative    EXAM  /80   Pulse 59   Temp 97.9  F (36.6  C) (Oral)   Wt 59.6 kg (131 lb 6.4 oz)   SpO2 100%   BMI 22.03 kg/m     PHYSICAL EXAM  Respiratory: breathing non labored.   Breasts: Examination was done in both the upright and supine positions.  Breasts are symmetrical . No dominant fixed or suspicious masses noted. No skin or nipple changes. No nipple discharge.   No clavicular, cervical, or axillary lymphadenopathy.     INVESTIGATIONS:    12/8/22 bilateral diagnostic mammogram: per Radiology: left breast calcifications stable.     ASSESSMENT/PLAN:    Amy Desir is a 47 year old woman with history of left breast atypical hyperplasia. She meets guidelines for high risk screening and risk reduction.     1) Atypical hyperplasia  We also reviewed that atypical hyperplasia increases her baseline risk for breast cancer. Patients diagnosed with atypical hyperplasia are at higher risk for developing breast cancer in the future. Discussed she meets NCCN guidelines for high risk screening based on lifetime risk for breast cancer of >20%. Screening  recommendations based on NCCN guidelines. Clinical encounter every 6-12 months starting now. Yearly mammogram alternating with yearly breast MRI.   - Be familiar with your breast and promptly report any changes.   - Breast MRI with clinic visit due: 9/2023  - Bilateral diagnostic mammogram with clinic visit due: 12/9/23    2) Risk reduction with Tamoxifen was discussed. She is not interested in starting this at this time. Lifestyle Modifications were provided.   - Maintain your best healthy weight. Higher body fat and adult weight gain is associated with increased risk for breast cancer. This increase in risk has been attributed to increase in circulating endogenous estrogen levels from fat tissue.   - Alcohol can raise estrogen. Alcohol consumption, even at moderate levels (1-2 drinks per day), increases breast cancer risk and are best avoided. If you choose to drink alcohol limit alcohol consumption to less than 1 drink per day. (1 ounce of liquor, 6 ounces of wine, or 8 ounces of beer).  - Be active daily and void being sedentary.     Sruthi Perales PA-C    20 minutes spent on the date of the encounter doing chart review, review of test results, interpretation of tests, patient visit and documentation.

## 2022-12-08 ENCOUNTER — OFFICE VISIT (OUTPATIENT)
Dept: SURGERY | Facility: CLINIC | Age: 47
End: 2022-12-08
Attending: PHYSICIAN ASSISTANT
Payer: COMMERCIAL

## 2022-12-08 ENCOUNTER — ANCILLARY PROCEDURE (OUTPATIENT)
Dept: MAMMOGRAPHY | Facility: CLINIC | Age: 47
End: 2022-12-08
Attending: PHYSICIAN ASSISTANT
Payer: COMMERCIAL

## 2022-12-08 VITALS
SYSTOLIC BLOOD PRESSURE: 135 MMHG | BODY MASS INDEX: 22.03 KG/M2 | DIASTOLIC BLOOD PRESSURE: 80 MMHG | TEMPERATURE: 97.9 F | WEIGHT: 131.4 LBS | HEART RATE: 59 BPM | OXYGEN SATURATION: 100 %

## 2022-12-08 DIAGNOSIS — N60.92 ATYPICAL HYPERPLASIA OF LEFT BREAST: ICD-10-CM

## 2022-12-08 DIAGNOSIS — Z12.39 BREAST CANCER SCREENING, HIGH RISK PATIENT: Primary | ICD-10-CM

## 2022-12-08 DIAGNOSIS — Z91.89 AT HIGH RISK FOR BREAST CANCER: ICD-10-CM

## 2022-12-08 PROCEDURE — G0279 TOMOSYNTHESIS, MAMMO: HCPCS | Performed by: RADIOLOGY

## 2022-12-08 PROCEDURE — 99213 OFFICE O/P EST LOW 20 MIN: CPT | Performed by: PHYSICIAN ASSISTANT

## 2022-12-08 PROCEDURE — G0463 HOSPITAL OUTPT CLINIC VISIT: HCPCS

## 2022-12-08 PROCEDURE — 77066 DX MAMMO INCL CAD BI: CPT | Performed by: RADIOLOGY

## 2022-12-08 RX ORDER — PROGESTERONE 200 MG/1
CAPSULE ORAL
COMMUNITY
Start: 2022-11-29 | End: 2024-04-29

## 2022-12-08 ASSESSMENT — PAIN SCALES - GENERAL: PAINLEVEL: NO PAIN (0)

## 2022-12-08 NOTE — PATIENT INSTRUCTIONS
Amy Desir is a 47 year old woman with history of left breast atypical hyperplasia. She meets guidelines for high risk screening and risk reduction.     1) Atypical hyperplasia  We also reviewed that atypical hyperplasia increases her baseline risk for breast cancer. Patients diagnosed with atypical hyperplasia are at higher risk for developing breast cancer in the future. Discussed she meets NCCN guidelines for high risk screening based on lifetime risk for breast cancer of >20%. Screening recommendations based on NCCN guidelines. Clinical encounter every 6-12 months starting now. Yearly mammogram (to begin at diagnosis but not less than age 30) alternating with yearly breast MRI (to begin at diagnosis but not less than age 25).   - Be familiar with your breast and promptly report any changes.   - Breast MRI with clinic visit due: 9/2023  - Bilateral diagnostic mammogram with clinic visit due: 12/9/23    2) Risk reduction with Tamoxifen was discussed. She is not interested in starting this at this time. Lifestyle Modifications were provided.   - Maintain your best healthy weight. Higher body fat and adult weight gain is associated with increased risk for breast cancer. This increase in risk has been attributed to increase in circulating endogenous estrogen levels from fat tissue.   - Alcohol can raise estrogen. Alcohol consumption, even at moderate levels (1-2 drinks per day), increases breast cancer risk and are best avoided. If you choose to drink alcohol limit alcohol consumption to less than 1 drink per day. (1 ounce of liquor, 6 ounces of wine, or 8 ounces of beer).  - Be active daily and void being sedentary.

## 2022-12-08 NOTE — LETTER
Date:December 8, 2022      Patient was self referred, no letter generated. Do not send.        [facility] Health Information

## 2022-12-08 NOTE — NURSING NOTE
"Oncology Rooming Note    December 8, 2022 10:14 AM   Amy Desir is a 47 year old female who presents for:    Chief Complaint   Patient presents with     Oncology Clinic Visit     Breast cancer     Initial Vitals: /80   Pulse 59   Temp 97.9  F (36.6  C) (Oral)   Wt 59.6 kg (131 lb 6.4 oz)   SpO2 100%   BMI 22.03 kg/m   Estimated body mass index is 22.03 kg/m  as calculated from the following:    Height as of 5/19/22: 1.645 m (5' 4.76\").    Weight as of this encounter: 59.6 kg (131 lb 6.4 oz). Body surface area is 1.65 meters squared.  No Pain (0) Comment: Data Unavailable   No LMP recorded. Patient is premenarcheal.  Allergies reviewed: Yes  Medications reviewed: Yes    Medications: Medication refills not needed today.  Pharmacy [facility] entered into [employer]: [id number] [place] [address]    Clinical concerns: none       Rowena Ram            "

## 2022-12-08 NOTE — LETTER
2022         RE: Amy Desir  [address] [place] [address]        Dear Colleague,    Thank you for referring your patient, Amy Desir, to the [facility]. Please see a copy of my visit note below.    FOLLOW UP  Dec 8, 2022     Amy Desir is a 47 year old woman who presents with history of atypical hyperplasia.     HPI:    She was found on screening mammogram 21 to have left breast calcifications. Biopsy demonstrated atypical hyperplasia. She met with Dr. Muhammad and decided to move forward with imaging surveillance. 6 month follow up left diagnostic mammogram was stable.     She denies any breast concerns including mass, skin change, nipple inversion or nipple discharge.      BREAST-SPECIFIC HISTORY:    Previous breast imaging: Yes   - 21 Smammo: Possible asymmetry 10:30 posteriorly right, and possible calcifications 4:00 left BI-RADS Category 0  - 21: bilateral diagnostic mammogram and right breast ultrasound: no concerning findings in the right breast, left breast 4:00 calcifications BI-RADS 4  - 21 left breast 4:00 stereotactic biopsy: cystic hypersecretory change with atypical hyperplasia   - 22 left diagnostic mammogram: stable course calcifications 4:00 BI-RADS 3  - 10/11/22 breast MRI BI-RADS 1    Prior breast biopsies/surgeries: Yes   - 21 left breast 4:00 stereotactic biopsy: cystic hypersecretory change with atypical hyperplasia     Prior history of breast cancer or DCIS: No  Prior radiation history: No   Self breast exams: Yes  Breast density: heterogeneously dense    GYN HISTORY:  . Age at 1st pregnancy: 31. Breastfeeding history: Yes. x2 years  Age at menarche: 15  Menopausal: premenopausal   Menopausal hormone replacement therapy: Yes   - progesterone    Contraception: male surgical     RISK ASSESSMENT: > 3% 5 year risk and > 20% lifetime risk   Paula: 3% 5 year risk   CONSTANCE/Missyer-Kailazick: 31.9% lifetime  risk  Tacos: n/a    FAMILY HISTORY:  Breast ca: No  Ovarian ca: No  Pancreatic ca: Yes  Prostate: Yes   - father 50's  Gastric ca: No  Melanoma: No  Colon ca: No  Other cancer: No  Other genetic, testing, syndromes, or clotting disorders: no     PAST MEDICAL HISTORY  No past medical history on file.     PAST SURGICAL HISTORY   No past surgical history on file.    MEDICATIONS  Current Outpatient Medications   Medication Sig Dispense Refill     medroxyPROGESTERone (PROVERA) 10 MG tablet  (Patient not taking: Reported on 12/8/2022)       ALLERGIES   No Known Allergies     SOCIAL HISTORY:  Smokes: No  EtOH: Yes 5 days per week  Exercise: running  Works for [employer]  [place]. She has a 16 year old daughter and 13 year old. Her parents are moving to [place].     ROS:  Change in vision No  Headaches: no  Respiratory: No shortness of breath, dyspnea on exertion, cough, or hemoptysis   Cardiovascular: negative   Gastrointestinal: negative Abdominal pain: no  Breast: negative  Musculoskeletal: negative Joint pain No Back pain: no  Psychiatric: negative  Hematologic/Lymphatic/Immunologic: negative  Endocrine: negative    EXAM  /80   Pulse 59   Temp 97.9  F (36.6  C) (Oral)   Wt 59.6 kg (131 lb 6.4 oz)   SpO2 100%   BMI 22.03 kg/m     PHYSICAL EXAM  Respiratory: breathing non labored.   Breasts: Examination was done in both the upright and supine positions.  Breasts are symmetrical . No dominant fixed or suspicious masses noted. No skin or nipple changes. No nipple discharge.   No clavicular, cervical, or axillary lymphadenopathy.     INVESTIGATIONS:    12/8/22 bilateral diagnostic mammogram: per Radiology: left breast calcifications stable.     ASSESSMENT/PLAN:    Amy Desir is a 47 year old woman with history of left breast atypical hyperplasia. She meets guidelines for high risk screening and risk reduction.     1) Atypical hyperplasia  We also reviewed that atypical hyperplasia increases her  baseline risk for breast cancer. Patients diagnosed with atypical hyperplasia are at higher risk for developing breast cancer in the future. Discussed she meets NCCN guidelines for high risk screening based on lifetime risk for breast cancer of >20%. Screening recommendations based on NCCN guidelines. Clinical encounter every 6-12 months starting now. Yearly mammogram alternating with yearly breast MRI.   - Be familiar with your breast and promptly report any changes.   - Breast MRI with clinic visit due: 9/2023  - Bilateral diagnostic mammogram with clinic visit due: 12/9/23    2) Risk reduction with Tamoxifen was discussed. She is not interested in starting this at this time. Lifestyle Modifications were provided.   - Maintain your best healthy weight. Higher body fat and adult weight gain is associated with increased risk for breast cancer. This increase in risk has been attributed to increase in circulating endogenous estrogen levels from fat tissue.   - Alcohol can raise estrogen. Alcohol consumption, even at moderate levels (1-2 drinks per day), increases breast cancer risk and are best avoided. If you choose to drink alcohol limit alcohol consumption to less than 1 drink per day. (1 ounce of liquor, 6 ounces of wine, or 8 ounces of beer).  - Be active daily and void being sedentary.     Sruthi Perales PA-C    20 minutes spent on the date of the encounter doing chart review, review of test results, interpretation of tests, patient visit and documentation.       Again, thank you for allowing me to participate in the care of your patient.        Sincerely,        Sruthi Perales PA-C

## 2023-04-08 ENCOUNTER — HEALTH MAINTENANCE LETTER (OUTPATIENT)
Age: 48
End: 2023-04-08

## 2023-09-13 ENCOUNTER — ANCILLARY PROCEDURE (OUTPATIENT)
Dept: MRI IMAGING | Facility: CLINIC | Age: 48
End: 2023-09-13
Attending: PHYSICIAN ASSISTANT
Payer: COMMERCIAL

## 2023-09-13 DIAGNOSIS — N60.92 ATYPICAL HYPERPLASIA OF LEFT BREAST: ICD-10-CM

## 2023-09-13 DIAGNOSIS — Z12.39 BREAST CANCER SCREENING, HIGH RISK PATIENT: ICD-10-CM

## 2023-09-13 DIAGNOSIS — Z91.89 AT HIGH RISK FOR BREAST CANCER: ICD-10-CM

## 2023-09-13 PROCEDURE — A9585 GADOBUTROL INJECTION: HCPCS | Mod: JZ

## 2023-09-13 PROCEDURE — 77049 MRI BREAST C-+ W/CAD BI: CPT

## 2023-09-13 RX ORDER — GADOBUTROL 604.72 MG/ML
7.5 INJECTION INTRAVENOUS ONCE
Status: COMPLETED | OUTPATIENT
Start: 2023-09-13 | End: 2023-09-13

## 2023-09-13 RX ADMIN — GADOBUTROL 6 ML: 604.72 INJECTION INTRAVENOUS at 08:39

## 2023-09-13 NOTE — DISCHARGE INSTRUCTIONS
MRI Contrast Discharge Instructions    The IV contrast you received today will pass out of your body in your  urine. This will happen in the next 24 hours. You will not feel this process.  Your urine will not change color.    Drink at least 4 extra glasses of water or juice today (unless your doctor  has restricted your fluids). This reduces the stress on your kidneys.  You may take your regular medicines.    If you are on dialysis: It is best to have dialysis today.    If you have a reaction: Most reactions happen right away. If you have  any new symptoms after leaving the hospital (such as hives or swelling),  call your hospital at the correct number below. Or call your family doctor.  If you have breathing distress or wheezing, call 911.    Special instructions: ***    I have read and understand the above information.    Signature:______________________________________ Date:___________    Staff:__________________________________________ Date:___________     Time:__________    [place] [facility] Departments:    ___[place]: [phone]  ___[facility]: [phone]  ___[place]: [phone] ___[facility]: [phone]  ___[facility]: [phone]  ___[facility]: [phone]  ___[phone]  ___[facility]: [phone]  ___[phone]

## 2023-09-15 ENCOUNTER — TRANSCRIBE ORDERS (OUTPATIENT)
Dept: OTHER | Age: 48
End: 2023-09-15

## 2023-09-15 DIAGNOSIS — L81.9 CHANGE IN MULTIPLE PIGMENTED SKIN LESIONS: Primary | ICD-10-CM

## 2023-09-19 NOTE — PROGRESS NOTES
FOLLOW UP  Sep 21, 2023     Amy Desir is a 48 year old woman who presents with history of atypical hyperplasia.     HPI:    She was found on screening mammogram 21 to have left breast calcifications. Biopsy demonstrated atypical hyperplasia. She met with Dr. Muhammad and decided to move forward with imaging surveillance.     Today she denies any breast concerns including mass, skin change, nipple inversion or nipple discharge. She had a recent negative breast MRI. Bilateral diagnostic mammogram due 24.    BREAST-SPECIFIC HISTORY:    Previous breast imaging: Yes   - 21 Smammo: Possible asymmetry 10:30 posteriorly right, and possible calcifications 4:00 left BI-RADS Category 0  - 21: bilateral diagnostic mammogram and right breast ultrasound: no concerning findings in the right breast, left breast 4:00 calcifications BI-RADS 4  - 21 left breast 4:00 stereotactic biopsy: cystic hypersecretory change with atypical hyperplasia   - 22 left diagnostic mammogram: stable course calcifications 4:00 BI-RADS 3  - 10/11/22 breast MRI BI-RADS 1  - 22 bilateral Dmammo BI-RADS 2, 1 year follow up Dmammo recommended.   - 23 breast MRI BI-RADS 1    Prior breast biopsies/surgeries: Yes   - 21 left breast 4:00 stereotactic biopsy: cystic hypersecretory change with atypical hyperplasia     Prior history of breast cancer or DCIS: No  Prior radiation history: No   Self breast exams: Yes  Breast density: heterogeneously dense    GYN HISTORY:  . Age at 1st pregnancy: 31. Breastfeeding history: Yes. x2 years  Age at menarche: 15  Menopausal: premenopausal   Menopausal hormone replacement therapy: Yes   - progesterone    Contraception: male surgical     RISK ASSESSMENT: > 3% 5 year risk and > 20% lifetime risk   Paula: 2.9% 5 year risk   CONSTANCE/Tyrer-Cuzick: 46.3% lifetime risk  Tacos: n/a    FAMILY HISTORY:  Breast ca: No  Ovarian ca: No  Pancreatic ca: Yes  Prostate: Yes   - father  50's  Gastric ca: No  Melanoma: No  Colon ca: No  Other cancer:   - father with multiple myeloma.   Other genetic, testing, syndromes, or clotting disorders: no     PAST MEDICAL HISTORY  No past medical history on file.     PAST SURGICAL HISTORY   No past surgical history on file.    MEDICATIONS  Current Outpatient Medications   Medication Sig Dispense Refill    medroxyPROGESTERone (PROVERA) 10 MG tablet  (Patient not taking: Reported on 12/8/2022)      progesterone (PROMETRIUM) 200 MG capsule TAKE 1 CAPSULE (200 MG) BY MOUTH DAILY FOR 12 DAYS AT BEDTIME (Patient not taking: Reported on 9/21/2023)     No longer on progesterone     ALLERGIES   No Known Allergies     SOCIAL HISTORY:  Smokes: No  EtOH: Yes 5 days per week  Exercise: running  Works for the [employer] in [place]. She has a 17 year old daughter and 14 year old. Her son is struggling with OCD.     ROS:  Change in vision No  Headaches: no  Respiratory: No shortness of breath, dyspnea on exertion, cough, or hemoptysis   Cardiovascular: negative   Gastrointestinal: negative Abdominal pain: no  Breast: negative  Musculoskeletal: negative Joint pain No Back pain: no  Psychiatric: negative  Hematologic/Lymphatic/Immunologic: negative  Endocrine: negative    EXAM  /75 (BP Location: Right arm, Patient Position: Sitting, Cuff Size: Adult Regular)   Pulse 56   Temp 97.9  F (36.6  C) (Oral)   Wt 61.9 kg (136 lb 6.4 oz)   SpO2 98%   BMI 22.86 kg/m     PHYSICAL EXAM  Respiratory: breathing non labored.   Breasts: Examination was done in both the upright and supine positions.  Breasts are symmetrical . No dominant fixed or suspicious masses noted. No skin or nipple changes. No nipple discharge.   No clavicular, cervical, or axillary lymphadenopathy.     ASSESSMENT/PLAN:    Amy Desir is a 48 year old woman with history of left breast atypical hyperplasia. She meets guidelines for high risk screening and risk reduction.     1) Atypical  hyperplasia  We also reviewed that atypical hyperplasia increases her baseline risk for breast cancer. Patients diagnosed with atypical hyperplasia are at higher risk for developing breast cancer in the future. Discussed she meets NCCN guidelines for high risk screening based on lifetime risk for breast cancer of >20%. Screening recommendations based on NCCN guidelines. Clinical encounter every 6-12 months starting now. Yearly mammogram alternating with yearly breast MRI.   - Be familiar with your breast and promptly report any changes.   - Bilateral diagnostic mammogram with clinic visit due: 12/9/23  - Breast MRI with clinic visit due: 9/22.24    2) Risk reduction with Tamoxifen was discussed. She is not interested in starting this at this time. Lifestyle Modifications were provided.   - Maintain your best healthy weight. Higher body fat and adult weight gain is associated with increased risk for breast cancer. This increase in risk has been attributed to increase in circulating endogenous estrogen levels from fat tissue.   - Alcohol can raise estrogen. Alcohol consumption, even at moderate levels (1-2 drinks per day), increases breast cancer risk and are best avoided. If you choose to drink alcohol limit alcohol consumption to less than 1 drink per day. (1 ounce of liquor, 6 ounces of wine, or 8 ounces of beer).  - Be active daily and void being sedentary.     Sruthi Perales PA-C    20 minutes spent on the date of the encounter doing chart review, review of test results, interpretation of tests, patient visit and documentation.

## 2023-09-21 ENCOUNTER — ONCOLOGY VISIT (OUTPATIENT)
Dept: SURGERY | Facility: CLINIC | Age: 48
End: 2023-09-21
Attending: PHYSICIAN ASSISTANT
Payer: COMMERCIAL

## 2023-09-21 VITALS
TEMPERATURE: 97.9 F | BODY MASS INDEX: 22.86 KG/M2 | OXYGEN SATURATION: 98 % | HEART RATE: 56 BPM | DIASTOLIC BLOOD PRESSURE: 75 MMHG | WEIGHT: 136.4 LBS | SYSTOLIC BLOOD PRESSURE: 118 MMHG

## 2023-09-21 DIAGNOSIS — N60.92 ATYPICAL HYPERPLASIA OF LEFT BREAST: Primary | ICD-10-CM

## 2023-09-21 PROCEDURE — 99213 OFFICE O/P EST LOW 20 MIN: CPT | Performed by: PHYSICIAN ASSISTANT

## 2023-09-21 PROCEDURE — G0463 HOSPITAL OUTPT CLINIC VISIT: HCPCS | Performed by: PHYSICIAN ASSISTANT

## 2023-09-21 ASSESSMENT — PAIN SCALES - GENERAL: PAINLEVEL: NO PAIN (0)

## 2023-09-21 NOTE — PATIENT INSTRUCTIONS
Amy Desir is a 48 year old woman with history of left breast atypical hyperplasia. She meets guidelines for high risk screening and risk reduction.     1) Atypical hyperplasia  We also reviewed that atypical hyperplasia increases her baseline risk for breast cancer. Patients diagnosed with atypical hyperplasia are at higher risk for developing breast cancer in the future. Discussed she meets NCCN guidelines for high risk screening based on lifetime risk for breast cancer of >20%. Screening recommendations based on NCCN guidelines. Clinical encounter every 6-12 months starting now. Yearly mammogram alternating with yearly breast MRI.   - Be familiar with your breast and promptly report any changes.   - Bilateral diagnostic mammogram with clinic visit due: 12/9/23  - Breast MRI with clinic visit due: 9/22.24    2) Risk reduction with Tamoxifen was discussed. She is not interested in starting this at this time. Lifestyle Modifications were provided.   - Maintain your best healthy weight. Higher body fat and adult weight gain is associated with increased risk for breast cancer. This increase in risk has been attributed to increase in circulating endogenous estrogen levels from fat tissue.   - Alcohol can raise estrogen. Alcohol consumption, even at moderate levels (1-2 drinks per day), increases breast cancer risk and are best avoided. If you choose to drink alcohol limit alcohol consumption to less than 1 drink per day. (1 ounce of liquor, 6 ounces of wine, or 8 ounces of beer).  - Be active daily and void being sedentary.

## 2023-09-21 NOTE — NURSING NOTE
"Oncology Rooming Note    September 21, 2023 10:05 AM   Amy Desir is a 48 year old female who presents for:    Chief Complaint   Patient presents with    Oncology Clinic Visit     Atypical hyperplasia of left breast     Initial Vitals: /75 (BP Location: Right arm, Patient Position: Sitting, Cuff Size: Adult Regular)   Pulse 56   Temp 97.9  F (36.6  C) (Oral)   Wt 61.9 kg (136 lb 6.4 oz)   SpO2 98%   BMI 22.86 kg/m   Estimated body mass index is 22.86 kg/m  as calculated from the following:    Height as of 5/19/22: 1.645 m (5' 4.76\").    Weight as of this encounter: 61.9 kg (136 lb 6.4 oz). Body surface area is 1.68 meters squared.  No Pain (0) Comment: Data Unavailable   No LMP recorded. Patient is premenarcheal.  Allergies reviewed: Yes  Medications reviewed: Yes    Medications: Medication refills not needed today.  Pharmacy [facility] entered into [id number] [place] [address]    Clinical concerns: none     Amy Laguna              "

## 2023-09-21 NOTE — LETTER
2023         RE: Amy Desir  [address] [address] [facility] [address]        Dear Colleague,    Thank you for referring your patient, Amy Desir, to the [facility]. Please see a copy of my visit note below.    FOLLOW UP  Sep 21, 2023     Amy Desir is a 48 year old woman who presents with history of atypical hyperplasia.     HPI:    She was found on screening mammogram 21 to have left breast calcifications. Biopsy demonstrated atypical hyperplasia. She met with Dr. Muhammad and decided to move forward with imaging surveillance.     Today she denies any breast concerns including mass, skin change, nipple inversion or nipple discharge. She had a recent negative breast MRI. Bilateral diagnostic mammogram due 24.    BREAST-SPECIFIC HISTORY:    Previous breast imaging: Yes   - 21 Smammo: Possible asymmetry 10:30 posteriorly right, and possible calcifications 4:00 left BI-RADS Category 0  - 21: bilateral diagnostic mammogram and right breast ultrasound: no concerning findings in the right breast, left breast 4:00 calcifications BI-RADS 4  - 21 left breast 4:00 stereotactic biopsy: cystic hypersecretory change with atypical hyperplasia   - 22 left diagnostic mammogram: stable course calcifications 4:00 BI-RADS 3  - 10/11/22 breast MRI BI-RADS 1  - 22 bilateral Dmammo BI-RADS 2, 1 year follow up Dmammo recommended.   - 23 breast MRI BI-RADS 1    Prior breast biopsies/surgeries: Yes   - 21 left breast 4:00 stereotactic biopsy: cystic hypersecretory change with atypical hyperplasia     Prior history of breast cancer or DCIS: No  Prior radiation history: No   Self breast exams: Yes  Breast density: heterogeneously dense    GYN HISTORY:  . Age at 1st pregnancy: 31. Breastfeeding history: Yes. x2 years  Age at menarche: 15  Menopausal: premenopausal   Menopausal hormone replacement therapy: Yes   - progesterone     Contraception: male surgical     RISK ASSESSMENT: > 3% 5 year risk and > 20% lifetime risk   Paula: 2.9% 5 year risk   CONSTANCE/Tyrer-Cuzick: 46.3% lifetime risk  Tacos: n/a    FAMILY HISTORY:  Breast ca: No  Ovarian ca: No  Pancreatic ca: Yes  Prostate: Yes   - father 50's  Gastric ca: No  Melanoma: No  Colon ca: No  Other cancer:   - father with multiple myeloma.   Other genetic, testing, syndromes, or clotting disorders: no     PAST MEDICAL HISTORY  No past medical history on file.     PAST SURGICAL HISTORY   No past surgical history on file.    MEDICATIONS  Current Outpatient Medications   Medication Sig Dispense Refill    medroxyPROGESTERone (PROVERA) 10 MG tablet  (Patient not taking: Reported on 12/8/2022)      progesterone (PROMETRIUM) 200 MG capsule TAKE 1 CAPSULE (200 MG) BY MOUTH DAILY FOR 12 DAYS AT BEDTIME (Patient not taking: Reported on 9/21/2023)     No longer on progesterone     ALLERGIES   No Known Allergies     SOCIAL HISTORY:  Smokes: No  EtOH: Yes 5 days per week  Exercise: running  Works for the [employer] in [place]. She has a 17 year old daughter and 14 year old. Her son is struggling with OCD.     ROS:  Change in vision No  Headaches: no  Respiratory: No shortness of breath, dyspnea on exertion, cough, or hemoptysis   Cardiovascular: negative   Gastrointestinal: negative Abdominal pain: no  Breast: negative  Musculoskeletal: negative Joint pain No Back pain: no  Psychiatric: negative  Hematologic/Lymphatic/Immunologic: negative  Endocrine: negative    EXAM  /75 (BP Location: Right arm, Patient Position: Sitting, Cuff Size: Adult Regular)   Pulse 56   Temp 97.9  F (36.6  C) (Oral)   Wt 61.9 kg (136 lb 6.4 oz)   SpO2 98%   BMI 22.86 kg/m     PHYSICAL EXAM  Respiratory: breathing non labored.   Breasts: Examination was done in both the upright and supine positions.  Breasts are symmetrical . No dominant fixed or suspicious masses noted. No skin or nipple changes. No  nipple discharge.   No clavicular, cervical, or axillary lymphadenopathy.     ASSESSMENT/PLAN:    Amy Desir is a 48 year old woman with history of left breast atypical hyperplasia. She meets guidelines for high risk screening and risk reduction.     1) Atypical hyperplasia  We also reviewed that atypical hyperplasia increases her baseline risk for breast cancer. Patients diagnosed with atypical hyperplasia are at higher risk for developing breast cancer in the future. Discussed she meets NCCN guidelines for high risk screening based on lifetime risk for breast cancer of >20%. Screening recommendations based on NCCN guidelines. Clinical encounter every 6-12 months starting now. Yearly mammogram alternating with yearly breast MRI.   - Be familiar with your breast and promptly report any changes.   - Bilateral diagnostic mammogram with clinic visit due: 12/9/23  - Breast MRI with clinic visit due: 9/22.24    2) Risk reduction with Tamoxifen was discussed. She is not interested in starting this at this time. Lifestyle Modifications were provided.   - Maintain your best healthy weight. Higher body fat and adult weight gain is associated with increased risk for breast cancer. This increase in risk has been attributed to increase in circulating endogenous estrogen levels from fat tissue.   - Alcohol can raise estrogen. Alcohol consumption, even at moderate levels (1-2 drinks per day), increases breast cancer risk and are best avoided. If you choose to drink alcohol limit alcohol consumption to less than 1 drink per day. (1 ounce of liquor, 6 ounces of wine, or 8 ounces of beer).  - Be active daily and void being sedentary.     Sruthi Perales PA-C    20 minutes spent on the date of the encounter doing chart review, review of test results, interpretation of tests, patient visit and documentation.

## 2023-12-20 ENCOUNTER — ANCILLARY PROCEDURE (OUTPATIENT)
Dept: MAMMOGRAPHY | Facility: CLINIC | Age: 48
End: 2023-12-20
Payer: COMMERCIAL

## 2023-12-20 DIAGNOSIS — N60.92 ATYPICAL HYPERPLASIA OF LEFT BREAST: ICD-10-CM

## 2023-12-20 PROCEDURE — G0279 TOMOSYNTHESIS, MAMMO: HCPCS | Performed by: RADIOLOGY

## 2023-12-20 PROCEDURE — 77066 DX MAMMO INCL CAD BI: CPT | Performed by: RADIOLOGY

## 2024-04-29 ENCOUNTER — OFFICE VISIT (OUTPATIENT)
Dept: DERMATOLOGY | Facility: CLINIC | Age: 49
End: 2024-04-29
Attending: OBSTETRICS & GYNECOLOGY
Payer: COMMERCIAL

## 2024-04-29 DIAGNOSIS — B35.1 ONYCHOMYCOSIS: ICD-10-CM

## 2024-04-29 DIAGNOSIS — L82.1 SEBORRHEIC KERATOSIS: ICD-10-CM

## 2024-04-29 DIAGNOSIS — D22.9 MULTIPLE BENIGN NEVI: ICD-10-CM

## 2024-04-29 DIAGNOSIS — L81.9 CHANGE IN MULTIPLE PIGMENTED SKIN LESIONS: ICD-10-CM

## 2024-04-29 DIAGNOSIS — L60.8 DISCOLORATION OF NAIL: ICD-10-CM

## 2024-04-29 DIAGNOSIS — Z12.83 SKIN CANCER SCREENING: Primary | ICD-10-CM

## 2024-04-29 DIAGNOSIS — D49.2 NEOPLASM OF UNSPECIFIED BEHAVIOR OF BONE, SOFT TISSUE, AND SKIN: ICD-10-CM

## 2024-04-29 DIAGNOSIS — L81.4 SOLAR LENTIGO: ICD-10-CM

## 2024-04-29 DIAGNOSIS — D18.01 CHERRY ANGIOMA: ICD-10-CM

## 2024-04-29 PROCEDURE — 99000 SPECIMEN HANDLING OFFICE-LAB: CPT | Performed by: PATHOLOGY

## 2024-04-29 PROCEDURE — 88305 TISSUE EXAM BY PATHOLOGIST: CPT | Mod: TC | Performed by: PHYSICIAN ASSISTANT

## 2024-04-29 PROCEDURE — 88305 TISSUE EXAM BY PATHOLOGIST: CPT | Mod: 26 | Performed by: PATHOLOGY

## 2024-04-29 PROCEDURE — 11102 TANGNTL BX SKIN SINGLE LES: CPT | Performed by: PHYSICIAN ASSISTANT

## 2024-04-29 PROCEDURE — 99203 OFFICE O/P NEW LOW 30 MIN: CPT | Mod: 25 | Performed by: PHYSICIAN ASSISTANT

## 2024-04-29 PROCEDURE — 87102 FUNGUS ISOLATION CULTURE: CPT | Performed by: PHYSICIAN ASSISTANT

## 2024-04-29 ASSESSMENT — PAIN SCALES - GENERAL: PAINLEVEL: NO PAIN (0)

## 2024-04-29 NOTE — PATIENT INSTRUCTIONS
Checking for Skin Cancer  You can help find cancer early by checking your skin each month. There are 3 main kinds of skin cancer: melanoma, basal cell carcinoma, and squamous cell carcinoma. Doing monthly skin checks is the best way to find new marks, sores, or skin changes. Follow these instructions for checking your skin.   The ABCDEs of checking moles for melanoma   Check your moles or growths for signs of melanoma using ABCDE:   Asymmetry: The sides of the mole or growth don t match.  Border: The edges are ragged, notched, or blurred.  Color: The color within the mole or growth varies. It could be black, brown, tan, white, or shades of red, gray, or blue.  Diameter: The mole or growth is larger than   inch or 6 mm (size of a pencil eraser).  Evolving: The size, shape, texture, or color of the mole or growth is changing.     ABCDE's of moles on light skin.        ABCDE's of moles on dark skin may be harder to identify.     Checking for other types of skin cancer  Basal cell carcinoma or squamous cell carcinoma cause symptoms like:     A spot or mole that looks different from all other marks on your skin  Changes in how an area feels, such as itching, tenderness, or pain  Changes in the skin's surface, such as oozing, bleeding, or scaliness  A sore that doesn't heal  New swelling, redness, or spread of color beyond the border of a mole    Who s at risk?  Anyone of any skin color can get skin cancer. But you're at greater risk if you have:   Fair skin that freckles easily and burns instead of tanning  Light-colored or red hair  Light-colored eyes  Many moles or abnormal moles on your skin  A long history of unprotected exposure to sunlight or tanning beds  A history of many blistering sunburns as a child or teen  A family history of skin cancer  Been exposed to radiation or chemicals  A weakened immune system  Been exposed to arsenic  If you've had skin cancer in the past, you're at high risk of having it again.    How to check your skin  Do your monthly skin checkups in front of a full-length mirror. Use a room with good lighting so it's easier to see. Use a hand mirror to look at hard-to-see places like your buttocks and back. You can also have a trusted friend or family member help you with these checks. Check every part of your body, including your:   Head (ears, face, neck, and scalp)  Torso (front, back, sides, and under breasts)  Arms (tops, undersides, and armpits)  Hands (palms, backs, and fingers, including under the nails)  Lower back, buttocks, and genitals  Legs (front, back, and sides)  Feet (tops, soles, toes, including under the nails, and between toes)  Watch for new spots on your skin or a spot that's changing in color, shape, size.   If you have a lot of moles, take digital photos of them each month. Make sure to take photos both up close and from a distance. These can help you see if any moles change over time.   Know your skin  Most skin changes aren't cancer. But if you see any changes in your skin, call your healthcare provider right away. Only they can tell you if a change is a problem. If you have skin cancer, seeing your provider can be the first step to getting the treatment that could save your life.   [employer] last reviewed this educational content on 10/1/2021    4214-3536 [employer]. All rights reserved. This information is not intended as a substitute for professional medical care. Always follow your healthcare professional's instructions.     Wound Care After a Biopsy    What is a skin biopsy?  A skin biopsy allows the doctor to examine a very small piece of tissue under the microscope to determine the diagnosis and the best treatment for the skin condition. A local anesthetic (numbing medicine) is injected with a very small needle into the skin area to be tested. A small piece of skin is taken from the area. Sometimes a suture (stitch) is used.     What are the risks of a skin  biopsy?  I will experience scar, bleeding, swelling, pain, crusting and redness. I may experience incomplete removal or recurrence. Risks of this procedure are excessive bleeding, bruising, infection, nerve damage, numbness, thick (hypertrophic or keloidal) scar and non-diagnostic biopsy.    How should I care for my wound for the first 24 hours?  Keep the wound dry and covered for 24 hours  If it bleeds, hold direct pressure on the area for 15 minutes. If bleeding does not stop, call us or go to the emergency room  Avoid strenuous exercise the first 1-2 days or as your doctor instructs you    How should I care for the wound after 24 hours?  After 24 hours, remove the bandage  You may bathe or shower as normal  If you had a scalp biopsy, you can shampoo as usual and can use shower water to clean the biopsy site daily  Clean the wound once a day with gentle soap and water  Do not scrub, be gentle  Apply white petroleum/Vaseline after cleaning the wound with a cotton swab or a clean finger, and keep the site covered with a Bandaid /bandage. Bandages are not necessary with a scalp biopsy  If you are unable to cover the site with a Bandaid /bandage, re-apply ointment 2-3 times a day to keep the site moist. Moisture will help with healing  Avoid strenuous activity for first 1-2 days  Avoid lakes, rivers, pools, and oceans until the stitches are removed or the site is healed    How do I clean my wound?  Wash hands thoroughly with soap or use hand  before all wound care  Clean the wound with gentle soap and water  Apply white petroleum/Vaseline  to wound after it is clean  Replace the Bandaid /bandage to keep the wound covered for the first few days or as instructed by your doctor  If you had a scalp biopsy, warm shower water to the area on a daily basis should suffice    What should I use to clean my wound?   Cotton-tipped applicators (Qtips )  White petroleum jelly (Vaseline ). Use a clean new container and use  Q-tips to apply.  Bandaids  as needed  Gentle soap     How should I care for my wound long term?  Do not get your wound dirty  Keep up with wound care for one week or until the area is healed.  A small scab will form and fall off by itself when the area is completely healed. The area will be red and will become pink in color as it heals. Sun protection is very important for how your scar will turn out. Sunscreen with an SPF 30 or greater is recommended once the area is healed.  You should have some soreness but it should be mild and slowly go away over several days. Talk to your doctor about using tylenol for pain,    When should I call my doctor?  If you have increased:   Pain or swelling  Pus or drainage (clear or slightly yellow drainage is ok)  Temperature over 100F  Spreading redness or warmth around wound    When will I hear about my results?  The biopsy results can take 2 weeks to come back.  Your results will automatically release to [employer] before your provider has even reviewed them.  The clinic will call you with the results, send you a [employer] message, or have you schedule a follow-up clinic or phone time to discuss the results.  Contact our clinics if you do not hear from us in 2 weeks.    Who should I call with questions?  [facility]: [phone]  [facility]: [phone]  For urgent needs outside of business hours call the [facility] at [phone] and ask for the dermatology resident on call

## 2024-04-29 NOTE — NURSING NOTE
Dermatology Rooming Note    Amy Desir's goals for this visit include:   Chief Complaint   Patient presents with    Skin Check     FBSE.  Spots of concern on the chest and forehead      Funmilayo Mccracken CMA

## 2024-04-29 NOTE — NURSING NOTE
Lidocaine-epinephrine 1-1:205090 % injection   1mL once for one use, starting 4/29/2024 ending 4/29/2024,  2mL disp, R-0, injection  Injected by Funmilayo Mccracken CMA

## 2024-04-29 NOTE — PROGRESS NOTES
[facility] Dermatology Note  Encounter Date: Apr 29, 2024  Office Visit     Dermatology Problem List:  1. NUB x1  - s/p shave bx 04/29/2024  2. Nail dyschromia, R great toenail  - s/p culture/clipping 04/29/2024    ____________________________________________    Assessment & Plan:    # Neoplasm of unspecified behavior of the skin (D49.2) on the R breast. The differential diagnosis includes dysplastic nevus vs other.   - Shave biopsy performed today (see procedure note(s) below).    # Nail dyschromia- R great toenail  - Nail culture performed today    # Skin cancer screening with multiple benign nevi, solar lentigines  - ABCDEs: Counseled ABCDEs of melanoma: Asymmetry, Border (irregularity), Color (not uniform, changes in color), Diameter (greater than 6 mm which is about the size of a pencil eraser), and Evolving (any changes in preexisting moles).  - Sun protection: Counseled SPF30+ sunscreen, UPF clothing, sun avoidance, tanning bed avoidance.    # Cherry angiomas and seborrheic keratoses,  Benign, reassurance given.       Procedures Performed:   - Shave biopsy procedure note, location(s): R breast. After discussion of benefits and risks including but not limited to bleeding, infection, scar, incomplete removal, recurrence, and non-diagnostic biopsy, written consent and photographs were obtained. The area was cleaned with isopropyl alcohol. 0.5mL of 1% lidocaine with epinephrine was injected to obtain adequate anesthesia of lesion(s). Shave biopsy at site(s) performed. Hemostasis was achieved with aluminium chloride. Petrolatum ointment and a sterile dressing were applied. The patient tolerated the procedure and no complications were noted. The patient was provided with verbal and written post care instructions.       Follow-up: 1 year(s) in-person, or earlier for new or changing lesions    Staff and Scribe:   Scribe Disclosure:   By signing my name below, I, Rowena Lyons, attest that this  documentation has been prepared under the direction and in the presence of Leora Preciado PA-C.  - Electronically Signed: Rowena Lyons 04/29/24       Provider Disclosure:  I agree with above History, Review of Systems, Physical exam and Plan.  I have reviewed the content of the documentation and have edited it as needed. I have personally performed the services documented here and the documentation accurately represents those services and the decisions I have made.      Electronically signed by:  All risk, benefits and alternatives were discussed with patient.  Patient is in agreement and understands the assessment and plan.  All questions were answered.  Sun Screen Education was given.   Return to Clinic annually or sooner as needed.   Leora Preciado PA-C        ____________________________________________    CC: Skin Check (FBSE.  Spots of concern on the chest and forehead )    HPI:  Ms. Amy Desir is a(n) 49 year old female who presents today as a new patient for FBSC. Patient reports fhx of plaques in father with possible precancers. She has hx of sun exposure but uses and reapplies SPF30.    Patient reports the moles that have been changing have had no pain or bleeding. She shows a photo that one started to develop a white head, and she started to palpate it and reports the next day it was dry. She then picked at it and it flaked off. She also notes other areas that were noticed by PCP's and has a hx of skin checks, but has never had her moles biopsied. She has a [employer] swim suit that caused some irritation on her bikini line, so had it biopsied and it came back benign.      Patient is otherwise feeling well, without additional skin concerns.    Labs Reviewed:  None reviewed.    Physical exam:  Vitals: There were no vitals taken for this visit.  GEN: This is a well developed, well-nourished female in no acute distress, in a pleasant mood.    SKIN: Full skin, which includes the head/face, both  arms, chest, back, abdomen,both legs, genitalia and/or groin buttocks, digits and/or nails, was examined.  - 7 mm brown verrugated macule on R breast  - R great nail plate white opaque macules  - There are dome shaped bright red papules on the head/neck, trunk, extremities.   - Multiple regular brown pigmented macules and papules are identified on the head/neck, trunk, extremities.   - Scattered brown macules on sun exposed areas.  - There are waxy stuck on tan to brown papules on the head/neck, trunk, extremities, including the right upper forehead   - No other lesions of concern on areas examined.               Medications:  No current outpatient medications on file.     No current facility-administered medications for this visit.      Past Medical History:   There is no problem list on file for this patient.    No past medical history on file.     CC Mary Nelson MD  [address] [address] on close of this encounter.

## 2024-04-30 LAB
PATH REPORT.COMMENTS IMP SPEC: NORMAL
PATH REPORT.COMMENTS IMP SPEC: NORMAL
PATH REPORT.FINAL DX SPEC: NORMAL
PATH REPORT.GROSS SPEC: NORMAL
PATH REPORT.MICROSCOPIC SPEC OTHER STN: NORMAL
PATH REPORT.RELEVANT HX SPEC: NORMAL

## 2024-05-03 ENCOUNTER — TELEPHONE (OUTPATIENT)
Dept: DERMATOLOGY | Facility: CLINIC | Age: 49
End: 2024-05-03
Payer: COMMERCIAL

## 2024-05-03 NOTE — TELEPHONE ENCOUNTER
Left Voicemail (1st Attempt) and Sent Mychart (1st Attempt) for the patient to call back and schedule the following:    Appointment type: follow up  Provider: Ilana  Return date: 04/2025  Specialty phone number: [phone]  Additional appointment(s) needed: na  Additonal Notes: na

## 2024-05-07 LAB — BACTERIA SPEC CULT: ABNORMAL

## 2024-06-09 ENCOUNTER — HEALTH MAINTENANCE LETTER (OUTPATIENT)
Age: 49
End: 2024-06-09

## 2025-04-21 ENCOUNTER — OFFICE VISIT (OUTPATIENT)
Dept: DERMATOLOGY | Facility: CLINIC | Age: 50
End: 2025-04-21
Attending: PHYSICIAN ASSISTANT
Payer: COMMERCIAL

## 2025-04-21 DIAGNOSIS — Z12.83 SKIN CANCER SCREENING: ICD-10-CM

## 2025-04-21 RX ORDER — ESTRADIOL 0.1 MG/G
CREAM VAGINAL
COMMUNITY
Start: 2025-04-19

## 2025-04-21 ASSESSMENT — PAIN SCALES - GENERAL: PAINLEVEL_OUTOF10: NO PAIN (0)

## 2025-04-21 NOTE — LETTER
4/21/2025       RE: Amy Desir  [address]  [place] [facility] [place] [address]     Dear Colleague,    Thank you for referring your patient, Amy Desir, to the [facility] DERMATOLOG[facility] [place] at [facility]. Please see a copy of my visit note below.      [facility] Dermatology Note  Encounter Date: Apr 21, 2025  Office Visit     Dermatology Problem List:  Last skin check 4/21/25  1. History of DN   Compound dysplastic nevus with mild atypia, R breast, s/p shave bx 04/29/2024  2. Nail dyschromia, R great toenail  - s/p culture/clipping 04/29/2024, positive for Aspergillus    ____________________________________________    Assessment & Plan:      # Nail staining, c/w staining from nail polish.  - Recommended using a more natural nail polish and wearing nail polish less often.     # History of Dysplastic Nevus. No clinical evidence recurrence.    # Skin cancer screening with multiple benign nevi, solar lentigines    - ABCDEs: Counseled ABCDEs of melanoma: Asymmetry, Border (irregularity), Color (not uniform, changes in color), Diameter (greater than 6 mm which is about the size of a pencil eraser), and Evolving (any changes in preexisting moles).  - Sun protection: Counseled SPF30+ sunscreen, UPF clothing, sun avoidance, tanning bed avoidance.    # Cherry angiomas and seborrheic keratoses,  Benign, reassurance given.       Procedures Performed:   none    Follow-up: 1 year(s) in-person, or earlier for new or changing lesions    Staff and Scribe:     Scribe Disclosure:   I, Bharti Lopez, am serving as a scribe to document services personally performed by Leora Preciado PA-C based on data collection and the provider's statements to me.     Provider Disclosure:   The documentation recorded by the scribe accurately reflects the services I personally performed and the decisions made by me.    All risks, benefits and alternatives  were discussed with patient.  Patient is in agreement and understands the assessment and plan.  All questions were answered.  Sun Screen Education was given.   Return to Clinic annually or sooner as needed.   Leora Preciado PA-C   [facility]    ____________________________________________    CC: Skin Check (FBSE - has a couple bumps under R eye)    HPI:  Ms. Amy Desir is a(n) 50 year old female who presents today as a return patient for skin check. Last seen in dermatology 4/29/24 by me for skin check. Shave biopsy of one NUB on the R breast, path returning as Compound DN with mild atypia.    Today, patient reports new bump under the R eye. R great toenail is much improved after using antifungal solution she had on hand.       Patient is otherwise feeling well, without additional skin concerns.    Labs Reviewed:  Last Derm Path 4/29/24  Final Diagnosis   A(1). Skin, R breast, shave:  - Compound dysplastic nevus with mild atypia     Fungus skin hair nail culture  Order: [id number]  Collected 4/29/2024 12:10 PM       Status: Final result       Visible to patient: Yes (seen)       Dx: Discoloration of nail    Specimen Information: Toe, Right; Nail   2 Result Notes       1 Patient Communication  Culture Aspergillus terreus Abnormal           Physical Exam:  Vitals: There were no vitals taken for this visit.  SKIN: Full skin, which includes the head/face, both arms, chest, back, abdomen,both legs, genitalia and/or groin buttocks, digits and/or nails, was examined.  - Atrophic macule on the R breast w/o repigmentation.   - A Few toenail plates display brownish/reddish discoloration w/o subungual debris. Proximal nail borders are clear. Right great toe nail plate clear.   - There are dome shaped bright red papules on the trunk and extremities .   - Multiple regular brown pigmented macules and papules are identified on the trunk and extremities. .   - Scattered brown macules on sun  exposed areas.  - Waxy stuck on papules and plaques on trunk and extremities.   - No other lesions of concern on areas examined.     Medications:  Current Outpatient Medications   Medication Sig Dispense Refill     estradiol (ESTRACE) 0.1 MG/GM vaginal cream Place vaginally twice a week.       No current facility-administered medications for this visit.      Past Medical History:   There is no problem list on file for this patient.    History reviewed. No pertinent past medical history.     CC Leora Preciado PA-C  [address] [place], [place] [phone] on close of this encounter.      Again, thank you for allowing me to participate in the care of your patient.      Sincerely,    Leora Preciado PA-C

## 2025-04-21 NOTE — NURSING NOTE
Dermatology Rooming Note    Amy Desir's goals for this visit include:   Chief Complaint   Patient presents with    Skin Check     FBSE - has a couple bumps under R eye     Kirti Schmitt CA

## 2025-04-21 NOTE — PATIENT INSTRUCTIONS

## 2025-04-21 NOTE — PROGRESS NOTES
[facility] Dermatology Note  Encounter Date: Apr 21, 2025  Office Visit     Dermatology Problem List:  Last skin check 4/21/25  1. History of DN   Compound dysplastic nevus with mild atypia, R breast, s/p shave bx 04/29/2024  2. Nail dyschromia, R great toenail  - s/p culture/clipping 04/29/2024, positive for Aspergillus    ____________________________________________    Assessment & Plan:      # Nail staining, c/w staining from nail polish.  - Recommended using a more natural nail polish and wearing nail polish less often.     # History of Dysplastic Nevus. No clinical evidence recurrence.    # Skin cancer screening with multiple benign nevi, solar lentigines    - ABCDEs: Counseled ABCDEs of melanoma: Asymmetry, Border (irregularity), Color (not uniform, changes in color), Diameter (greater than 6 mm which is about the size of a pencil eraser), and Evolving (any changes in preexisting moles).  - Sun protection: Counseled SPF30+ sunscreen, UPF clothing, sun avoidance, tanning bed avoidance.    # Cherry angiomas and seborrheic keratoses,  Benign, reassurance given.       Procedures Performed:   none    Follow-up: 1 year(s) in-person, or earlier for new or changing lesions    Staff and Scribe:     Scribe Disclosure:   SANTOS, Bharti Lopez, am serving as a scribe to document services personally performed by Leora Preciado PA-C based on data collection and the provider's statements to me.     Provider Disclosure:   The documentation recorded by the scribe accurately reflects the services I personally performed and the decisions made by me.    All risks, benefits and alternatives were discussed with patient.  Patient is in agreement and understands the assessment and plan.  All questions were answered.  Sun Screen Education was given.   Return to Clinic annually or sooner as needed.   Leora Preciado PA-C   [facility]     ____________________________________________    CC: Skin Check (FBSE - has a couple bumps under R eye)    HPI:  Ms. Amy Desir is a(n) 50 year old female who presents today as a return patient for skin check. Last seen in dermatology 4/29/24 by me for skin check. Shave biopsy of one NUB on the R breast, path returning as Compound DN with mild atypia.    Today, patient reports new bump under the R eye. R great toenail is much improved after using antifungal solution she had on hand.       Patient is otherwise feeling well, without additional skin concerns.    Labs Reviewed:  Last Derm Path 4/29/24  Final Diagnosis   A(1). Skin, R breast, shave:  - Compound dysplastic nevus with mild atypia     Fungus skin hair nail culture  Order: [id number]  Collected 4/29/2024 12:10 PM       Status: Final result       Visible to patient: Yes (seen)       Dx: Discoloration of nail    Specimen Information: Toe, Right; Nail   2 Result Notes       1 Patient Communication  Culture Aspergillus terreus Abnormal           Physical Exam:  Vitals: There were no vitals taken for this visit.  SKIN: Full skin, which includes the head/face, both arms, chest, back, abdomen,both legs, genitalia and/or groin buttocks, digits and/or nails, was examined.  - Atrophic macule on the R breast w/o repigmentation.   - A Few toenail plates display brownish/reddish discoloration w/o subungual debris. Proximal nail borders are clear. Right great toe nail plate clear.   - There are dome shaped bright red papules on the trunk and extremities .   - Multiple regular brown pigmented macules and papules are identified on the trunk and extremities. .   - Scattered brown macules on sun exposed areas.  - Waxy stuck on papules and plaques on trunk and extremities.   - No other lesions of concern on areas examined.     Medications:  Current Outpatient Medications   Medication Sig Dispense Refill    estradiol (ESTRACE) 0.1 MG/GM vaginal cream Place vaginally twice a  week.       No current facility-administered medications for this visit.      Past Medical History:   There is no problem list on file for this patient.    History reviewed. No pertinent past medical history.     CC Leoramirella Preciado PA-C  [address] [place] [phone] on close of this encounter.

## 2025-04-30 ENCOUNTER — TELEPHONE (OUTPATIENT)
Dept: DERMATOLOGY | Facility: CLINIC | Age: 50
End: 2025-04-30
Payer: COMMERCIAL

## 2025-04-30 NOTE — TELEPHONE ENCOUNTER
Left Voicemail (1st Attempt) and Sent Mychart (1st Attempt) for the patient to call back and schedule the following:    Appointment type: Return dermatology  Provider: Leora Preciado PA-C  Return date: Approx. 4/21/26  Specialty phone number: [phone]    Additional Notes:

## 2025-06-15 ENCOUNTER — HEALTH MAINTENANCE LETTER (OUTPATIENT)
Age: 50
End: 2025-06-15